# Patient Record
Sex: FEMALE | Race: BLACK OR AFRICAN AMERICAN | NOT HISPANIC OR LATINO | URBAN - METROPOLITAN AREA
[De-identification: names, ages, dates, MRNs, and addresses within clinical notes are randomized per-mention and may not be internally consistent; named-entity substitution may affect disease eponyms.]

---

## 2019-03-29 ENCOUNTER — INPATIENT (INPATIENT)
Facility: HOSPITAL | Age: 64
LOS: 3 days | Discharge: HOME | DRG: 394 | End: 2019-04-02
Attending: SURGERY | Admitting: SURGERY
Payer: MEDICARE

## 2019-03-29 VITALS
HEART RATE: 112 BPM | RESPIRATION RATE: 16 BRPM | DIASTOLIC BLOOD PRESSURE: 78 MMHG | SYSTOLIC BLOOD PRESSURE: 113 MMHG | OXYGEN SATURATION: 99 % | TEMPERATURE: 98 F

## 2019-03-29 DIAGNOSIS — Z90.710 ACQUIRED ABSENCE OF BOTH CERVIX AND UTERUS: Chronic | ICD-10-CM

## 2019-03-29 LAB
ALBUMIN SERPL ELPH-MCNC: 4.6 G/DL — SIGNIFICANT CHANGE UP (ref 3.3–5)
ALP SERPL-CCNC: 77 U/L — SIGNIFICANT CHANGE UP (ref 40–120)
ALT FLD-CCNC: 20 U/L — SIGNIFICANT CHANGE UP (ref 10–45)
ANION GAP SERPL CALC-SCNC: 10 MMOL/L — SIGNIFICANT CHANGE UP (ref 5–17)
APPEARANCE UR: CLEAR — SIGNIFICANT CHANGE UP
APTT BLD: 34 SEC — SIGNIFICANT CHANGE UP (ref 27.5–36.3)
AST SERPL-CCNC: 25 U/L — SIGNIFICANT CHANGE UP (ref 10–40)
BASOPHILS # BLD AUTO: 0.03 K/UL — SIGNIFICANT CHANGE UP (ref 0–0.2)
BASOPHILS NFR BLD AUTO: 0.4 % — SIGNIFICANT CHANGE UP (ref 0–2)
BILIRUB SERPL-MCNC: 0.3 MG/DL — SIGNIFICANT CHANGE UP (ref 0.2–1.2)
BILIRUB UR-MCNC: NEGATIVE — SIGNIFICANT CHANGE UP
BUN SERPL-MCNC: 15 MG/DL — SIGNIFICANT CHANGE UP (ref 7–23)
CALCIUM SERPL-MCNC: 9.3 MG/DL — SIGNIFICANT CHANGE UP (ref 8.4–10.5)
CHLORIDE SERPL-SCNC: 102 MMOL/L — SIGNIFICANT CHANGE UP (ref 96–108)
CK MB CFR SERPL CALC: <1 NG/ML — SIGNIFICANT CHANGE UP (ref 0–6.7)
CK SERPL-CCNC: 104 U/L — SIGNIFICANT CHANGE UP (ref 25–170)
CO2 SERPL-SCNC: 27 MMOL/L — SIGNIFICANT CHANGE UP (ref 22–31)
COLOR SPEC: YELLOW — SIGNIFICANT CHANGE UP
CREAT SERPL-MCNC: 1.17 MG/DL — SIGNIFICANT CHANGE UP (ref 0.5–1.3)
DIFF PNL FLD: NEGATIVE — SIGNIFICANT CHANGE UP
EOSINOPHIL # BLD AUTO: 0.09 K/UL — SIGNIFICANT CHANGE UP (ref 0–0.5)
EOSINOPHIL NFR BLD AUTO: 1.2 % — SIGNIFICANT CHANGE UP (ref 0–6)
GLUCOSE SERPL-MCNC: 108 MG/DL — HIGH (ref 70–99)
GLUCOSE UR QL: NEGATIVE — SIGNIFICANT CHANGE UP
HCT VFR BLD CALC: 39.7 % — SIGNIFICANT CHANGE UP (ref 34.5–45)
HGB BLD-MCNC: 13.4 G/DL — SIGNIFICANT CHANGE UP (ref 11.5–15.5)
IMM GRANULOCYTES NFR BLD AUTO: 0.3 % — SIGNIFICANT CHANGE UP (ref 0–1.5)
INR BLD: 1.04 — SIGNIFICANT CHANGE UP (ref 0.88–1.16)
KETONES UR-MCNC: NEGATIVE — SIGNIFICANT CHANGE UP
LEUKOCYTE ESTERASE UR-ACNC: NEGATIVE — SIGNIFICANT CHANGE UP
LYMPHOCYTES # BLD AUTO: 2.18 K/UL — SIGNIFICANT CHANGE UP (ref 1–3.3)
LYMPHOCYTES # BLD AUTO: 28.5 % — SIGNIFICANT CHANGE UP (ref 13–44)
MCHC RBC-ENTMCNC: 33.8 GM/DL — SIGNIFICANT CHANGE UP (ref 32–36)
MCHC RBC-ENTMCNC: 34 PG — SIGNIFICANT CHANGE UP (ref 27–34)
MCV RBC AUTO: 100.8 FL — HIGH (ref 80–100)
MONOCYTES # BLD AUTO: 0.48 K/UL — SIGNIFICANT CHANGE UP (ref 0–0.9)
MONOCYTES NFR BLD AUTO: 6.3 % — SIGNIFICANT CHANGE UP (ref 2–14)
NEUTROPHILS # BLD AUTO: 4.86 K/UL — SIGNIFICANT CHANGE UP (ref 1.8–7.4)
NEUTROPHILS NFR BLD AUTO: 63.3 % — SIGNIFICANT CHANGE UP (ref 43–77)
NITRITE UR-MCNC: NEGATIVE — SIGNIFICANT CHANGE UP
NRBC # BLD: 0 /100 WBCS — SIGNIFICANT CHANGE UP (ref 0–0)
PH UR: 6 — SIGNIFICANT CHANGE UP (ref 5–8)
PLATELET # BLD AUTO: 193 K/UL — SIGNIFICANT CHANGE UP (ref 150–400)
POTASSIUM SERPL-MCNC: 4.6 MMOL/L — SIGNIFICANT CHANGE UP (ref 3.5–5.3)
POTASSIUM SERPL-SCNC: 4.6 MMOL/L — SIGNIFICANT CHANGE UP (ref 3.5–5.3)
PROT SERPL-MCNC: 7.7 G/DL — SIGNIFICANT CHANGE UP (ref 6–8.3)
PROT UR-MCNC: NEGATIVE MG/DL — SIGNIFICANT CHANGE UP
PROTHROM AB SERPL-ACNC: 11.8 SEC — SIGNIFICANT CHANGE UP (ref 10–12.9)
RBC # BLD: 3.94 M/UL — SIGNIFICANT CHANGE UP (ref 3.8–5.2)
RBC # FLD: 11.9 % — SIGNIFICANT CHANGE UP (ref 10.3–14.5)
SODIUM SERPL-SCNC: 139 MMOL/L — SIGNIFICANT CHANGE UP (ref 135–145)
SP GR SPEC: <=1.005 — SIGNIFICANT CHANGE UP (ref 1–1.03)
TROPONIN T SERPL-MCNC: <0.01 NG/ML — SIGNIFICANT CHANGE UP (ref 0–0.01)
UROBILINOGEN FLD QL: 0.2 E.U./DL — SIGNIFICANT CHANGE UP
WBC # BLD: 7.66 K/UL — SIGNIFICANT CHANGE UP (ref 3.8–10.5)
WBC # FLD AUTO: 7.66 K/UL — SIGNIFICANT CHANGE UP (ref 3.8–10.5)

## 2019-03-29 PROCEDURE — 99285 EMERGENCY DEPT VISIT HI MDM: CPT | Mod: 25

## 2019-03-29 PROCEDURE — 74177 CT ABD & PELVIS W/CONTRAST: CPT | Mod: 26

## 2019-03-29 PROCEDURE — 93010 ELECTROCARDIOGRAM REPORT: CPT

## 2019-03-29 PROCEDURE — 93971 EXTREMITY STUDY: CPT | Mod: 26,LT

## 2019-03-29 RX ORDER — IOHEXOL 300 MG/ML
30 INJECTION, SOLUTION INTRAVENOUS ONCE
Refills: 0 | Status: COMPLETED | OUTPATIENT
Start: 2019-03-29 | End: 2019-03-29

## 2019-03-29 RX ORDER — SODIUM CHLORIDE 9 MG/ML
1000 INJECTION, SOLUTION INTRAVENOUS
Refills: 0 | Status: DISCONTINUED | OUTPATIENT
Start: 2019-03-29 | End: 2019-03-30

## 2019-03-29 RX ORDER — OLANZAPINE 15 MG/1
5 TABLET, FILM COATED ORAL DAILY
Refills: 0 | Status: DISCONTINUED | OUTPATIENT
Start: 2019-03-29 | End: 2019-04-02

## 2019-03-29 RX ORDER — ACETAMINOPHEN 500 MG
650 TABLET ORAL ONCE
Refills: 0 | Status: DISCONTINUED | OUTPATIENT
Start: 2019-03-29 | End: 2019-04-02

## 2019-03-29 RX ORDER — HEPARIN SODIUM 5000 [USP'U]/ML
1500 INJECTION INTRAVENOUS; SUBCUTANEOUS
Qty: 25000 | Refills: 0 | Status: DISCONTINUED | OUTPATIENT
Start: 2019-03-29 | End: 2019-03-30

## 2019-03-29 RX ORDER — ALBUTEROL 90 UG/1
1 AEROSOL, METERED ORAL DAILY
Refills: 0 | Status: DISCONTINUED | OUTPATIENT
Start: 2019-03-29 | End: 2019-04-02

## 2019-03-29 RX ORDER — SERTRALINE 25 MG/1
100 TABLET, FILM COATED ORAL DAILY
Refills: 0 | Status: DISCONTINUED | OUTPATIENT
Start: 2019-03-29 | End: 2019-04-02

## 2019-03-29 RX ORDER — FAMOTIDINE 10 MG/ML
20 INJECTION INTRAVENOUS ONCE
Refills: 0 | Status: COMPLETED | OUTPATIENT
Start: 2019-03-29 | End: 2019-03-29

## 2019-03-29 RX ORDER — HYDROMORPHONE HYDROCHLORIDE 2 MG/ML
0.5 INJECTION INTRAMUSCULAR; INTRAVENOUS; SUBCUTANEOUS EVERY 4 HOURS
Refills: 0 | Status: DISCONTINUED | OUTPATIENT
Start: 2019-03-29 | End: 2019-03-31

## 2019-03-29 RX ORDER — HYDROMORPHONE HYDROCHLORIDE 2 MG/ML
0.25 INJECTION INTRAMUSCULAR; INTRAVENOUS; SUBCUTANEOUS EVERY 4 HOURS
Refills: 0 | Status: DISCONTINUED | OUTPATIENT
Start: 2019-03-29 | End: 2019-03-31

## 2019-03-29 RX ORDER — SODIUM CHLORIDE 9 MG/ML
1000 INJECTION INTRAMUSCULAR; INTRAVENOUS; SUBCUTANEOUS ONCE
Refills: 0 | Status: COMPLETED | OUTPATIENT
Start: 2019-03-29 | End: 2019-03-29

## 2019-03-29 RX ORDER — PIPERACILLIN AND TAZOBACTAM 4; .5 G/20ML; G/20ML
3.38 INJECTION, POWDER, LYOPHILIZED, FOR SOLUTION INTRAVENOUS ONCE
Refills: 0 | Status: COMPLETED | OUTPATIENT
Start: 2019-03-29 | End: 2019-03-29

## 2019-03-29 RX ORDER — SODIUM CHLORIDE 9 MG/ML
1000 INJECTION INTRAMUSCULAR; INTRAVENOUS; SUBCUTANEOUS
Refills: 0 | Status: DISCONTINUED | OUTPATIENT
Start: 2019-03-29 | End: 2019-03-30

## 2019-03-29 RX ORDER — PIPERACILLIN AND TAZOBACTAM 4; .5 G/20ML; G/20ML
3.38 INJECTION, POWDER, LYOPHILIZED, FOR SOLUTION INTRAVENOUS EVERY 6 HOURS
Refills: 0 | Status: DISCONTINUED | OUTPATIENT
Start: 2019-03-29 | End: 2019-04-01

## 2019-03-29 RX ORDER — ONDANSETRON 8 MG/1
4 TABLET, FILM COATED ORAL ONCE
Refills: 0 | Status: COMPLETED | OUTPATIENT
Start: 2019-03-29 | End: 2019-03-29

## 2019-03-29 RX ORDER — ONDANSETRON 8 MG/1
4 TABLET, FILM COATED ORAL EVERY 6 HOURS
Refills: 0 | Status: DISCONTINUED | OUTPATIENT
Start: 2019-03-29 | End: 2019-04-02

## 2019-03-29 RX ADMIN — FAMOTIDINE 20 MILLIGRAM(S): 10 INJECTION INTRAVENOUS at 10:41

## 2019-03-29 RX ADMIN — HEPARIN SODIUM 15 UNIT(S)/HR: 5000 INJECTION INTRAVENOUS; SUBCUTANEOUS at 20:43

## 2019-03-29 RX ADMIN — PIPERACILLIN AND TAZOBACTAM 200 GRAM(S): 4; .5 INJECTION, POWDER, LYOPHILIZED, FOR SOLUTION INTRAVENOUS at 17:38

## 2019-03-29 RX ADMIN — SODIUM CHLORIDE 1000 MILLILITER(S): 9 INJECTION INTRAMUSCULAR; INTRAVENOUS; SUBCUTANEOUS at 18:30

## 2019-03-29 RX ADMIN — IOHEXOL 30 MILLILITER(S): 300 INJECTION, SOLUTION INTRAVENOUS at 10:50

## 2019-03-29 RX ADMIN — SODIUM CHLORIDE 1000 MILLILITER(S): 9 INJECTION INTRAMUSCULAR; INTRAVENOUS; SUBCUTANEOUS at 10:40

## 2019-03-29 RX ADMIN — PIPERACILLIN AND TAZOBACTAM 3.38 GRAM(S): 4; .5 INJECTION, POWDER, LYOPHILIZED, FOR SOLUTION INTRAVENOUS at 18:30

## 2019-03-29 RX ADMIN — ONDANSETRON 4 MILLIGRAM(S): 8 TABLET, FILM COATED ORAL at 10:41

## 2019-03-29 RX ADMIN — SODIUM CHLORIDE 150 MILLILITER(S): 9 INJECTION INTRAMUSCULAR; INTRAVENOUS; SUBCUTANEOUS at 21:44

## 2019-03-29 NOTE — ED ADULT NURSE REASSESSMENT NOTE - NS ED NURSE REASSESS COMMENT FT1
[id number] no deficits no sob no chest pain no n/v.  pt reminded of NPO status.  iv intact.  pending floor assignment.

## 2019-03-29 NOTE — ED PROVIDER NOTE - CARE PLAN
Principal Discharge DX:	Nausea & vomiting  Secondary Diagnosis:	Leg pain Principal Discharge DX:	Nausea & vomiting  Assessment and plan of treatment:	2/2 appendicitis.  Surgery following.  Will keep patient NPO for possible appendectomy.  Secondary Diagnosis:	Leg pain

## 2019-03-29 NOTE — H&P ADULT - NSHPPHYSICALEXAM_GEN_ALL_CORE
General: WN/WD NAD  Neurology: A&Ox3, nonfocal, VALLECILLO x 4  Abdominal: Soft, Tender in RLQ, with mild guarding. no rebound tenderness  Extremities: No edema, + peripheral pulses  Skin: No Rashes, Hematoma, Ecchymosis

## 2019-03-29 NOTE — ED ADULT TRIAGE NOTE - CHIEF COMPLAINT QUOTE
Pt c/o non-bloody vomiting x two days, twice this am. Also reports LLE numbness and tingling, s/p varicose vein surgery on 3/14.

## 2019-03-29 NOTE — ED PROVIDER NOTE - CLINICAL SUMMARY MEDICAL DECISION MAKING FREE TEXT BOX
64 yo F presents w/ nausea, vomiting x 2 days.  Pt has had significant pain following varicose vein ablation procedure.  Stopped NSAIDs.  PE significant for tenderness in RLQ.  No previous hx of diverticulosis. 62 yo F presents w/ nausea, vomiting x 2 days.  Pt has had significant pain following varicose vein ablation procedure.  Stopped NSAIDs.  PE significant for tenderness in RLQ and LLQ.  No previous hx of diverticulosis.  Surgical hx only significant for ARACELIS.  CT a/p reveals likely appendicitis.  LLE pain likely 2/2 thrombophlebitis.  Pt given zosyn.  Surgery and vascular consulted.  Pt will likely go for appendectomy. 64 yo F presents w/ nausea, vomiting x 2 days.  Pt has had significant pain following varicose vein ablation procedure.  Stopped NSAIDs.  PE significant for tenderness in RLQ and LLQ.  No previous hx of diverticulosis.  Surgical hx only significant for ARACELIS.  CT a/p reveals likely appendicitis.   Pt given zosyn.  Surgery and vascular consulted.   Dopplers reveal extensive clot with extension into saphenofemoral junction.  Pt will likely go for appendectomy.  Will likely require anticoagulation post surgery.

## 2019-03-29 NOTE — H&P ADULT - HISTORY OF PRESENT ILLNESS
62 yo F w/ PMHX of bipolar, HLD, varicose veins s/p varicose vein ablation 3/14 in OSH, presenting now with RLQ pain x 3 days. Patient reports that she has been having abdominal pain but tried to ignore it, until yesterday when she began to vomit. This AM, she complained that she had severe abdominal pain, and continuous emesis, prompting her to present to [facility] ED. She otherwise denies fevers or chills. Hx of ARACELIS approximately 10 years ago.

## 2019-03-29 NOTE — CONSULT NOTE ADULT - ASSESSMENT
64 yo F with hx of varicose vein s/p ablation in outside clinic 3/14, p/w abd pain, nausea/vomiting for 2 days and LLE pain since surgery. No leukocytosis. CT abd/pelvis revealed possible acute/chronic appendicitis and US LLE revealed acute thrombus in the left greater saphenous vein with possible extending to saphenofemoral junction.     Recs :  To follow up with general surgery regarding further plan OR vs no OR  Recommending anticoagulant if patient is not going to surgery  Pain control : tylenol/ibuprofen with meals   Recommending repeat US after 1 week of anticoagulant to evaluate the thrombus.     Plan pending to d/w attending, Seen with chief

## 2019-03-29 NOTE — H&P ADULT - NSHPLABSRESULTS_GEN_ALL_CORE
LABS/RADIOLOGY RESULTS:                          13.4   7.66  )-----------( 193      ( 29 Mar 2019 10:53 )             39.7   03-29    139  |  102  |  15  ----------------------------<  108<H>  4.6   |  27  |  1.17    Ca    9.3      29 Mar 2019 10:53    TPro  7.7  /  Alb  4.6  /  TBili  0.3  /  DBili  x   /  AST  25  /  ALT  20  /  AlkPhos  77  03-29  PT/INR - ( 29 Mar 2019 10:53 )   PT: 11.8 sec;   INR: 1.04          PTT - ( 29 Mar 2019 10:53 )  PTT:34.0 secBlood Cultures    < from: CT Abdomen and Pelvis w/ Oral Cont and w/ IV Cont (03.29.19 @ 13:20) >    FINDINGS: CT of the abdomen and pelvis was performed with the   administration of intravenous contrast. Reconstructions were performed in   the sagittal and coronal planes. No prior studies are available for   comparison.    Evaluation of the lower chest demonstrates normal heart size. There is   negative for pleural or pericardial effusion. Lower lung parenchyma is   unremarkable. Evaluation of the abdomen demonstrates that the liver,   spleen, pancreas, bilateral adrenal glands and bilateral kidneys are   normal in appearance. There is minimal cholelithiasis. Evaluation of the   gastrointestinal tract demonstrates sigmoid diverticulosis. There is   circumferential mural thickening of the appendix which is nondistended   and negative for significant periappendiceal edematous infiltration or   fluid collection. Negative for bowel obstruction. Evaluation pelvis   demonstrates the bladder is unremarkable. Hysterectomy. Bilateral adnexal   regions are unremarkable. Small fat-containing right inguinal hernia. No   free fluid. No adenopathy. Mild aortic vascular calcification. Opacified   aspects of the hepatic, portal, splenic, superior mesenteric vein,   bilateral renal veins, IVC and bilateral iliac veins demonstrates no   janina intraluminal thrombus. Evaluation of the osseous structures   demonstratesno destructive lesion.          IMPRESSION:    Findings suggestive of early or possibly chronic appendicitis.    < end of copied text >    < from: US Duplex Venous Lower Ext Ltd, Left (03.29.19 @ 14:52) >      IMPRESSION:  No deep vein thrombosis seen.  Acute thrombus seen in the left greater saphenous vein. Follow-up   recommended to rule out proximal propagation into the left common femoral   vein.      < end of copied text >

## 2019-03-29 NOTE — ED PROVIDER NOTE - GASTROINTESTINAL, MLM
Abdomen soft, point tenderness in LLQ, slight guarding, + BS Abdomen soft, LLQ and RLQ TTP, slight guarding, + BS

## 2019-03-29 NOTE — ED PROVIDER NOTE - OBJECTIVE STATEMENT
64 yo F w/ PMHX of bipolar depression, HLD, varicose veins presents w/ nausea and vomiting x 2 days.  Pt also complaining of severe LLE pain following varicose vein ablation procedure on 3/14.  Pain is rated 9/10, described as a pins and needles sensation.  Pain improved w/ ibuprofen, but patient stopped taking medication after a few days because it was causing stomach discomfort.  Last meal was this morning after which she vomitted, NBNB.  ROS otherwise negative for fevers, chills, CP, SOB, palpitations, abdominal pain, diarrhea, constipation, dysuria, focal motor defects.  Pt reports generalized malaise.

## 2019-03-29 NOTE — ED ADULT NURSE NOTE - OBJECTIVE STATEMENT
Patient is a 62yo female reporting 2 days of non-bloody vomiting s/p vascular surgery for varicose veins on 3/14. Patient denies chest pain, shortness of breath, diarrhea, fevers.

## 2019-03-29 NOTE — H&P ADULT - NSICDXFAMILYHX_GEN_ALL_CORE_FT
FAMILY HISTORY:  Mother  Still living? Unknown  Family history of stroke, Age at diagnosis: Age Unknown

## 2019-03-29 NOTE — CONSULT NOTE ADULT - SUBJECTIVE AND OBJECTIVE BOX
HPI :    This is a 64 yo F w/ PMHX of bipolar, HLD, varicose veins s/p varicose vein ablation 3/14 in outside clinic presents w/ nausea and vomiting x 2 days and severe LLE pain following varicose vein ablation procedure on 3/14.  Pain was severe and described as a pins and needles sensation.  Pain improved w/ ibuprofen, but patient stopped taking medication after a few days because it was causing stomach discomfort.  Last meal was this morning after which she vomitted, NBNB.  ROS otherwise negative for fevers, chills.       Vital Signs Last 24 Hrs  T(C): 36.9 (29 Mar 2019 16:18), Max: 36.9 (29 Mar 2019 09:44)  T(F): 98.5 (29 Mar 2019 16:18), Max: 98.5 (29 Mar 2019 09:44)  HR: 76 (29 Mar 2019 16:18) (76 - 112)  BP: 106/65 (29 Mar 2019 16:18) (106/65 - 113/78)  RR: 16 (29 Mar 2019 16:18) (16 - 16)  SpO2: 99% (29 Mar 2019 16:18) (99% - 99%)  I&O's Detail      General: NAD, resting comfortably in bed  C/V: NSR  Pulm: Nonlabored breathing, no respiratory distress  Abd:   Extrem:         LABS:                        13.4   7.66  )-----------( 193      ( 29 Mar 2019 10:53 )             39.7     03-29    139  |  102  |  15  ----------------------------<  108<H>  4.6   |  27  |  1.17    Ca    9.3      29 Mar 2019 10:53    TPro  7.7  /  Alb  4.6  /  TBili  0.3  /  DBili  x   /  AST  25  /  ALT  20  /  AlkPhos  77  03-29    PT/INR - ( 29 Mar 2019 10:53 )   PT: 11.8 sec;   INR: 1.04          PTT - ( 29 Mar 2019 10:53 )  PTT:34.0 sec      RADIOLOGY & ADDITIONAL STUDIES: HPI :    This is a 64 yo F w/ PMHX of bipolar, HLD, varicose veins s/p varicose vein ablation 3/14 in outside clinic presents w/ nausea and vomiting x 2 days and severe LLE pain following varicose vein ablation procedure on 3/14.  Pain was severe and described as a pins and needles sensation.  Pain improved w/ ibuprofen, but patient stopped taking medication after a few days because it was causing stomach discomfort.  Last meal was this morning after which she vomitted, NBNB.  ROS otherwise negative for fevers, chills.       Vital Signs Last 24 Hrs  T(C): 36.9 (29 Mar 2019 16:18), Max: 36.9 (29 Mar 2019 09:44)  T(F): 98.5 (29 Mar 2019 16:18), Max: 98.5 (29 Mar 2019 09:44)  HR: 76 (29 Mar 2019 16:18) (76 - 112)  BP: 106/65 (29 Mar 2019 16:18) (106/65 - 113/78)  RR: 16 (29 Mar 2019 16:18) (16 - 16)  SpO2: 99% (29 Mar 2019 16:18) (99% - 99%)  I&O's Detail      General: NAD, resting comfortably in bed  C/V: NSR  Pulm: Nonlabored breathing, no respiratory distress  Abd: RLQ tenderness, no rebound, no guarding  Extrem:   LLE >non pitting edema. non tender on palpation. No skin changes       LABS:                        13.4   7.66  )-----------( 193      ( 29 Mar 2019 10:53 )             39.7     03-29    139  |  102  |  15  ----------------------------<  108<H>  4.6   |  27  |  1.17    Ca    9.3      29 Mar 2019 10:53    TPro  7.7  /  Alb  4.6  /  TBili  0.3  /  DBili  x   /  AST  25  /  ALT  20  /  AlkPhos  77  03-29    PT/INR - ( 29 Mar 2019 10:53 )   PT: 11.8 sec;   INR: 1.04          PTT - ( 29 Mar 2019 10:53 )  PTT:34.0 sec      RADIOLOGY & ADDITIONAL STUDIES:  FINDINGS: CT of the abdomen and pelvis was performed with the   administration of intravenous contrast. Reconstructions were performed in   the sagittal and coronal planes. No prior studies are available for   comparison.    Evaluation of the lower chest demonstrates normal heart size. There is   negative for pleural or pericardial effusion. Lower lung parenchyma is   unremarkable. Evaluation of the abdomen demonstrates that the liver,   spleen, pancreas, bilateral adrenal glands and bilateral kidneys are   normal in appearance. There is minimal cholelithiasis. Evaluation of the   gastrointestinal tract demonstrates sigmoid diverticulosis. There is   circumferential mural thickening of the appendix which is nondistended   and negative for significant periappendiceal edematous infiltration or   fluid collection. Negative for bowel obstruction. Evaluation pelvis   demonstrates the bladder is unremarkable. Hysterectomy. Bilateral adnexal   regions are unremarkable. Small fat-containing right inguinal hernia. No   free fluid. No adenopathy. Mild aortic vascular calcification. Opacified   aspects of the hepatic, portal, splenic, superior mesenteric vein,   bilateral renal veins, IVC and bilateral iliac veins demonstrates no   jannia intraluminal thrombus. Evaluation of the osseous structures   demonstrates no destructive lesion.      IMPRESSION:    Findings suggestive of early or possibly chronic appendicitis.    FINDINGS:    Thigh veins: The left common femoral, femoral, popliteal, proximal   greater saphenous, and proximal deep femoral veins are patent and free of   thrombus. The veins are normally compressible and have normal phasic flow   and augmentation response.    Calf veins: The paired peroneal and posterior tibial calf veins are   patent.    Contralateral CFV: The contralateral (right) common femoral vein is   patent and free of thrombus.      There is occlusive acute thrombus involving the left greater saphenous   vein in the left thigh with extension to the saphenofemoral junction. The   SVT involving the left greater saphenous vein extends down to the left   knee. Also evidence of thrombosis of superficial venous varicosities in   the proximal left calf.    IMPRESSION:  No deep vein thrombosis seen.  Acute thrombus seen in the left greater saphenous vein. Follow-up   recommended to rule out proximal propagation into the left common femoral   vein. HPI :    This is a 62 yo F w/ PMHX of bipolar, HLD, varicose veins s/p varicose vein ablation 3/14 in outside clinic p/w LLE pain following varicose vein ablation procedure on 3/14.  Pain was severe and described as a pins and needles sensation.  Pain improved w/ ibuprofen, but patient stopped taking medication after a few days because it was causing stomach discomfort.  Last meal was this morning after which she vomited NBNB.  ROS otherwise negative for fevers, chills.   Patient has been having RLQ abdominal pain, nausea and vomiting for 2 days. Initially she thought that its due to her taking ibuprophen. CT abd showed possible acute/chronic appendicitis.    Vital Signs Last 24 Hrs  T(C): 36.9 (29 Mar 2019 16:18), Max: 36.9 (29 Mar 2019 09:44)  T(F): 98.5 (29 Mar 2019 16:18), Max: 98.5 (29 Mar 2019 09:44)  HR: 76 (29 Mar 2019 16:18) (76 - 112)  BP: 106/65 (29 Mar 2019 16:18) (106/65 - 113/78)  RR: 16 (29 Mar 2019 16:18) (16 - 16)  SpO2: 99% (29 Mar 2019 16:18) (99% - 99%)  I&O's Detail      General: NAD, resting comfortably in bed  C/V: NSR  Pulm: Nonlabored breathing, no respiratory distress  Abd: RLQ tenderness, no rebound, no guarding  Extrem:   LLE >non pitting edema. non tender on palpation. No skin changes       LABS:                        13.4   7.66  )-----------( 193      ( 29 Mar 2019 10:53 )             39.7     03-29    139  |  102  |  15  ----------------------------<  108<H>  4.6   |  27  |  1.17    Ca    9.3      29 Mar 2019 10:53    TPro  7.7  /  Alb  4.6  /  TBili  0.3  /  DBili  x   /  AST  25  /  ALT  20  /  AlkPhos  77  03-29    PT/INR - ( 29 Mar 2019 10:53 )   PT: 11.8 sec;   INR: 1.04          PTT - ( 29 Mar 2019 10:53 )  PTT:34.0 sec      RADIOLOGY & ADDITIONAL STUDIES:  FINDINGS: CT of the abdomen and pelvis was performed with the   administration of intravenous contrast. Reconstructions were performed in   the sagittal and coronal planes. No prior studies are available for   comparison.    Evaluation of the lower chest demonstrates normal heart size. There is   negative for pleural or pericardial effusion. Lower lung parenchyma is   unremarkable. Evaluation of the abdomen demonstrates that the liver,   spleen, pancreas, bilateral adrenal glands and bilateral kidneys are   normal in appearance. There is minimal cholelithiasis. Evaluation of the   gastrointestinal tract demonstrates sigmoid diverticulosis. There is   circumferential mural thickening of the appendix which is nondistended   and negative for significant periappendiceal edematous infiltration or   fluid collection. Negative for bowel obstruction. Evaluation pelvis   demonstrates the bladder is unremarkable. Hysterectomy. Bilateral adnexal   regions are unremarkable. Small fat-containing right inguinal hernia. No   free fluid. No adenopathy. Mild aortic vascular calcification. Opacified   aspects of the hepatic, portal, splenic, superior mesenteric vein,   bilateral renal veins, IVC and bilateral iliac veins demonstrates no   janina intraluminal thrombus. Evaluation of the osseous structures   demonstrates no destructive lesion.      IMPRESSION:    Findings suggestive of early or possibly chronic appendicitis.    FINDINGS:    Thigh veins: The left common femoral, femoral, popliteal, proximal   greater saphenous, and proximal deep femoral veins are patent and free of   thrombus. The veins are normally compressible and have normal phasic flow   and augmentation response.    Calf veins: The paired peroneal and posterior tibial calf veins are   patent.    Contralateral CFV: The contralateral (right) common femoral vein is   patent and free of thrombus.      There is occlusive acute thrombus involving the left greater saphenous   vein in the left thigh with extension to the saphenofemoral junction. The   SVT involving the left greater saphenous vein extends down to the left   knee. Also evidence of thrombosis of superficial venous varicosities in   the proximal left calf.    IMPRESSION:  No deep vein thrombosis seen.  Acute thrombus seen in the left greater saphenous vein. Follow-up   recommended to rule out proximal propagation into the left common femoral   vein.

## 2019-03-29 NOTE — H&P ADULT - ASSESSMENT
62 yo F w/ symptoms and CT scan concerning for acute appendicitis, given her SFJ thrombosis, will opt for non-operative management with IV ABX & IV Heparin, with low threshold to proceed to OR    Will admit to Dr. Blanton  IV ABX  IV Heparin for thrombosis  Only Psych meds PO  NPO/IVF  Serial abdominal exams  Pain & nausea control PRN  AM labs    Seen and examined with chief resident, discussed with attending

## 2019-03-30 LAB
ANION GAP SERPL CALC-SCNC: 10 MMOL/L — SIGNIFICANT CHANGE UP (ref 5–17)
APTT BLD: 40.3 SEC — HIGH (ref 27.5–36.3)
APTT BLD: 46.8 SEC — HIGH (ref 27.5–36.3)
APTT BLD: 85.3 SEC — HIGH (ref 27.5–36.3)
APTT BLD: >200 SEC — CRITICAL HIGH (ref 27.5–36.3)
BUN SERPL-MCNC: 10 MG/DL — SIGNIFICANT CHANGE UP (ref 7–23)
CALCIUM SERPL-MCNC: 8.6 MG/DL — SIGNIFICANT CHANGE UP (ref 8.4–10.5)
CHLORIDE SERPL-SCNC: 105 MMOL/L — SIGNIFICANT CHANGE UP (ref 96–108)
CO2 SERPL-SCNC: 26 MMOL/L — SIGNIFICANT CHANGE UP (ref 22–31)
CREAT SERPL-MCNC: 1.21 MG/DL — SIGNIFICANT CHANGE UP (ref 0.5–1.3)
GLUCOSE SERPL-MCNC: 87 MG/DL — SIGNIFICANT CHANGE UP (ref 70–99)
HCT VFR BLD CALC: 37.5 % — SIGNIFICANT CHANGE UP (ref 34.5–45)
HGB BLD-MCNC: 11.9 G/DL — SIGNIFICANT CHANGE UP (ref 11.5–15.5)
INR BLD: 1.09 — SIGNIFICANT CHANGE UP (ref 0.88–1.16)
MAGNESIUM SERPL-MCNC: 2 MG/DL — SIGNIFICANT CHANGE UP (ref 1.6–2.6)
MCHC RBC-ENTMCNC: 31.7 GM/DL — LOW (ref 32–36)
MCHC RBC-ENTMCNC: 32.3 PG — SIGNIFICANT CHANGE UP (ref 27–34)
MCV RBC AUTO: 101.9 FL — HIGH (ref 80–100)
NRBC # BLD: 0 /100 WBCS — SIGNIFICANT CHANGE UP (ref 0–0)
PHOSPHATE SERPL-MCNC: 4.1 MG/DL — SIGNIFICANT CHANGE UP (ref 2.5–4.5)
PLATELET # BLD AUTO: 187 K/UL — SIGNIFICANT CHANGE UP (ref 150–400)
POTASSIUM SERPL-MCNC: 4.5 MMOL/L — SIGNIFICANT CHANGE UP (ref 3.5–5.3)
POTASSIUM SERPL-SCNC: 4.5 MMOL/L — SIGNIFICANT CHANGE UP (ref 3.5–5.3)
PROTHROM AB SERPL-ACNC: 12.3 SEC — SIGNIFICANT CHANGE UP (ref 10–12.9)
RBC # BLD: 3.68 M/UL — LOW (ref 3.8–5.2)
RBC # FLD: 11.9 % — SIGNIFICANT CHANGE UP (ref 10.3–14.5)
SODIUM SERPL-SCNC: 141 MMOL/L — SIGNIFICANT CHANGE UP (ref 135–145)
WBC # BLD: 5.88 K/UL — SIGNIFICANT CHANGE UP (ref 3.8–10.5)
WBC # FLD AUTO: 5.88 K/UL — SIGNIFICANT CHANGE UP (ref 3.8–10.5)

## 2019-03-30 PROCEDURE — 93971 EXTREMITY STUDY: CPT | Mod: 26,LT

## 2019-03-30 RX ORDER — SODIUM CHLORIDE 9 MG/ML
1000 INJECTION, SOLUTION INTRAVENOUS
Refills: 0 | Status: DISCONTINUED | OUTPATIENT
Start: 2019-03-30 | End: 2019-04-02

## 2019-03-30 RX ORDER — HEPARIN SODIUM 5000 [USP'U]/ML
1200 INJECTION INTRAVENOUS; SUBCUTANEOUS
Qty: 25000 | Refills: 0 | Status: DISCONTINUED | OUTPATIENT
Start: 2019-03-30 | End: 2019-03-30

## 2019-03-30 RX ORDER — HEPARIN SODIUM 5000 [USP'U]/ML
1000 INJECTION INTRAVENOUS; SUBCUTANEOUS
Qty: 25000 | Refills: 0 | Status: DISCONTINUED | OUTPATIENT
Start: 2019-03-30 | End: 2019-03-30

## 2019-03-30 RX ORDER — HEPARIN SODIUM 5000 [USP'U]/ML
1150 INJECTION INTRAVENOUS; SUBCUTANEOUS
Qty: 25000 | Refills: 0 | Status: DISCONTINUED | OUTPATIENT
Start: 2019-03-30 | End: 2019-03-31

## 2019-03-30 RX ADMIN — SERTRALINE 100 MILLIGRAM(S): 25 TABLET, FILM COATED ORAL at 18:16

## 2019-03-30 RX ADMIN — ONDANSETRON 4 MILLIGRAM(S): 8 TABLET, FILM COATED ORAL at 23:03

## 2019-03-30 RX ADMIN — OLANZAPINE 5 MILLIGRAM(S): 15 TABLET, FILM COATED ORAL at 18:15

## 2019-03-30 RX ADMIN — HYDROMORPHONE HYDROCHLORIDE 0.5 MILLIGRAM(S): 2 INJECTION INTRAMUSCULAR; INTRAVENOUS; SUBCUTANEOUS at 00:44

## 2019-03-30 RX ADMIN — ALBUTEROL 1 PUFF(S): 90 AEROSOL, METERED ORAL at 17:41

## 2019-03-30 RX ADMIN — PIPERACILLIN AND TAZOBACTAM 200 GRAM(S): 4; .5 INJECTION, POWDER, LYOPHILIZED, FOR SOLUTION INTRAVENOUS at 18:14

## 2019-03-30 RX ADMIN — PIPERACILLIN AND TAZOBACTAM 200 GRAM(S): 4; .5 INJECTION, POWDER, LYOPHILIZED, FOR SOLUTION INTRAVENOUS at 23:03

## 2019-03-30 RX ADMIN — SODIUM CHLORIDE 110 MILLILITER(S): 9 INJECTION, SOLUTION INTRAVENOUS at 06:12

## 2019-03-30 RX ADMIN — ONDANSETRON 4 MILLIGRAM(S): 8 TABLET, FILM COATED ORAL at 00:23

## 2019-03-30 RX ADMIN — PIPERACILLIN AND TAZOBACTAM 200 GRAM(S): 4; .5 INJECTION, POWDER, LYOPHILIZED, FOR SOLUTION INTRAVENOUS at 06:12

## 2019-03-30 RX ADMIN — PIPERACILLIN AND TAZOBACTAM 200 GRAM(S): 4; .5 INJECTION, POWDER, LYOPHILIZED, FOR SOLUTION INTRAVENOUS at 00:54

## 2019-03-30 NOTE — PROGRESS NOTE ADULT - ASSESSMENT
63 F hx varicose veinss/p ablation 3/14, found to have SFJ thrombosis on presentation, admitted for acute appendicitis, nonoperatively managed given her SFJ thrombosis, with low threshold to proceed to OR    IV ABX  IV Heparin for LLE thrombosis, f/u vascular surg recs  Only Psych meds PO  NPO/IVF  Serial abdominal exams  Pain & nausea control PRN  AM labs

## 2019-03-30 NOTE — PROGRESS NOTE ADULT - ASSESSMENT
64 yo F with hx of varicose vein s/p ablation in outside clinic 3/14, p/w abd pain, nausea/vomiting for 2 days and LLE pain since surgery. No leukocytosis. CT abd/pelvis revealed possible acute/chronic appendicitis and US LLE revealed acute thrombus in the left greater saphenous vein with possible extending to saphenofemoral junction.     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 anticoagulation  - per Dr. Blanton, general surgery amenable to medical management of appendicitis for now, recommend transition to therapeutic Lovenox, and follow up w/ vascular surgery as outpatient w/ Dr. Youssef in 2 weeks for repeat US;    - cont. leg elevation and compression w/ ACE wrap  - Pain control : tylenol/ibuprofen with meals   - have patient call [phone] to schedule appointment for repeat US  call for acute changes or if you have questions   - discussed with chief on call

## 2019-03-31 LAB
ANION GAP SERPL CALC-SCNC: 11 MMOL/L — SIGNIFICANT CHANGE UP (ref 5–17)
APTT BLD: 121.1 SEC — CRITICAL HIGH (ref 27.5–36.3)
APTT BLD: 123.2 SEC — CRITICAL HIGH (ref 27.5–36.3)
APTT BLD: >200 SEC — CRITICAL HIGH (ref 27.5–36.3)
BUN SERPL-MCNC: 8 MG/DL — SIGNIFICANT CHANGE UP (ref 7–23)
CALCIUM SERPL-MCNC: 8.5 MG/DL — SIGNIFICANT CHANGE UP (ref 8.4–10.5)
CHLORIDE SERPL-SCNC: 107 MMOL/L — SIGNIFICANT CHANGE UP (ref 96–108)
CO2 SERPL-SCNC: 22 MMOL/L — SIGNIFICANT CHANGE UP (ref 22–31)
CREAT SERPL-MCNC: 1.11 MG/DL — SIGNIFICANT CHANGE UP (ref 0.5–1.3)
GLUCOSE SERPL-MCNC: 105 MG/DL — HIGH (ref 70–99)
HCT VFR BLD CALC: 36.6 % — SIGNIFICANT CHANGE UP (ref 34.5–45)
HGB BLD-MCNC: 12.1 G/DL — SIGNIFICANT CHANGE UP (ref 11.5–15.5)
INR BLD: 1.15 — SIGNIFICANT CHANGE UP (ref 0.88–1.16)
MAGNESIUM SERPL-MCNC: 2 MG/DL — SIGNIFICANT CHANGE UP (ref 1.6–2.6)
MCHC RBC-ENTMCNC: 33.1 GM/DL — SIGNIFICANT CHANGE UP (ref 32–36)
MCHC RBC-ENTMCNC: 33.1 PG — SIGNIFICANT CHANGE UP (ref 27–34)
MCV RBC AUTO: 100 FL — SIGNIFICANT CHANGE UP (ref 80–100)
NRBC # BLD: 0 /100 WBCS — SIGNIFICANT CHANGE UP (ref 0–0)
PHOSPHATE SERPL-MCNC: 3.7 MG/DL — SIGNIFICANT CHANGE UP (ref 2.5–4.5)
PLATELET # BLD AUTO: 169 K/UL — SIGNIFICANT CHANGE UP (ref 150–400)
POTASSIUM SERPL-MCNC: 3.8 MMOL/L — SIGNIFICANT CHANGE UP (ref 3.5–5.3)
POTASSIUM SERPL-SCNC: 3.8 MMOL/L — SIGNIFICANT CHANGE UP (ref 3.5–5.3)
PROTHROM AB SERPL-ACNC: 13.1 SEC — HIGH (ref 10–12.9)
RBC # BLD: 3.66 M/UL — LOW (ref 3.8–5.2)
RBC # FLD: 11.8 % — SIGNIFICANT CHANGE UP (ref 10.3–14.5)
SODIUM SERPL-SCNC: 140 MMOL/L — SIGNIFICANT CHANGE UP (ref 135–145)
WBC # BLD: 5.57 K/UL — SIGNIFICANT CHANGE UP (ref 3.8–10.5)
WBC # FLD AUTO: 5.57 K/UL — SIGNIFICANT CHANGE UP (ref 3.8–10.5)

## 2019-03-31 RX ORDER — HEPARIN SODIUM 5000 [USP'U]/ML
1050 INJECTION INTRAVENOUS; SUBCUTANEOUS
Qty: 25000 | Refills: 0 | Status: DISCONTINUED | OUTPATIENT
Start: 2019-03-31 | End: 2019-03-31

## 2019-03-31 RX ORDER — HEPARIN SODIUM 5000 [USP'U]/ML
5000 INJECTION INTRAVENOUS; SUBCUTANEOUS EVERY 6 HOURS
Refills: 0 | Status: DISCONTINUED | OUTPATIENT
Start: 2019-03-31 | End: 2019-04-01

## 2019-03-31 RX ORDER — HEPARIN SODIUM 5000 [USP'U]/ML
950 INJECTION INTRAVENOUS; SUBCUTANEOUS
Qty: 25000 | Refills: 0 | Status: DISCONTINUED | OUTPATIENT
Start: 2019-03-31 | End: 2019-04-01

## 2019-03-31 RX ORDER — OXYCODONE AND ACETAMINOPHEN 5; 325 MG/1; MG/1
2 TABLET ORAL EVERY 4 HOURS
Refills: 0 | Status: DISCONTINUED | OUTPATIENT
Start: 2019-03-31 | End: 2019-04-02

## 2019-03-31 RX ORDER — HEPARIN SODIUM 5000 [USP'U]/ML
2500 INJECTION INTRAVENOUS; SUBCUTANEOUS EVERY 6 HOURS
Refills: 0 | Status: DISCONTINUED | OUTPATIENT
Start: 2019-03-31 | End: 2019-04-01

## 2019-03-31 RX ORDER — HEPARIN SODIUM 5000 [USP'U]/ML
5000 INJECTION INTRAVENOUS; SUBCUTANEOUS ONCE
Refills: 0 | Status: COMPLETED | OUTPATIENT
Start: 2019-03-31 | End: 2019-03-31

## 2019-03-31 RX ADMIN — HEPARIN SODIUM 5000 UNIT(S): 5000 INJECTION INTRAVENOUS; SUBCUTANEOUS at 18:21

## 2019-03-31 RX ADMIN — PIPERACILLIN AND TAZOBACTAM 200 GRAM(S): 4; .5 INJECTION, POWDER, LYOPHILIZED, FOR SOLUTION INTRAVENOUS at 18:35

## 2019-03-31 RX ADMIN — PIPERACILLIN AND TAZOBACTAM 200 GRAM(S): 4; .5 INJECTION, POWDER, LYOPHILIZED, FOR SOLUTION INTRAVENOUS at 05:26

## 2019-03-31 RX ADMIN — ONDANSETRON 4 MILLIGRAM(S): 8 TABLET, FILM COATED ORAL at 06:04

## 2019-03-31 RX ADMIN — PIPERACILLIN AND TAZOBACTAM 200 GRAM(S): 4; .5 INJECTION, POWDER, LYOPHILIZED, FOR SOLUTION INTRAVENOUS at 12:16

## 2019-03-31 RX ADMIN — HEPARIN SODIUM 1050 UNIT(S)/HR: 5000 INJECTION INTRAVENOUS; SUBCUTANEOUS at 13:40

## 2019-03-31 RX ADMIN — HEPARIN SODIUM 9.5 UNIT(S)/HR: 5000 INJECTION INTRAVENOUS; SUBCUTANEOUS at 19:25

## 2019-03-31 RX ADMIN — SERTRALINE 100 MILLIGRAM(S): 25 TABLET, FILM COATED ORAL at 11:37

## 2019-03-31 RX ADMIN — SODIUM CHLORIDE 80 MILLILITER(S): 9 INJECTION, SOLUTION INTRAVENOUS at 11:36

## 2019-03-31 RX ADMIN — OLANZAPINE 5 MILLIGRAM(S): 15 TABLET, FILM COATED ORAL at 12:16

## 2019-03-31 RX ADMIN — ALBUTEROL 1 PUFF(S): 90 AEROSOL, METERED ORAL at 11:37

## 2019-03-31 NOTE — PROVIDER CONTACT NOTE (CRITICAL VALUE NOTIFICATION) - BACKGROUND
Patient was admitted for appendicitis and was discovered to have a DVT. Patient has since been on heparin drip to treat clot.

## 2019-03-31 NOTE — PROGRESS NOTE ADULT - ASSESSMENT
63 F hx varicose veinss/p ablation 3/14, found to have SFJ thrombosis on presentation, admitted for acute appendicitis, nonoperatively managed given her SFJ thrombosis, with low threshold to proceed to OR    IV ABX  IV Heparin for LLE thrombosis, f/u vascular surg recs  Only Psych meds PO  CLD/IVF -> advance to regular today  Serial abdominal exams  Pain & nausea control PRN  AM labs

## 2019-03-31 NOTE — PROVIDER CONTACT NOTE (CRITICAL VALUE NOTIFICATION) - RECOMMENDATIONS
Re-draw labs for comfirmation
MD Elizondo aware and will change rate
MD aware and dose will be adjusted

## 2019-03-31 NOTE — PROVIDER CONTACT NOTE (CRITICAL VALUE NOTIFICATION) - ACTION/TREATMENT ORDERED:
Hold heparin for 2 hours. Will restart at 9:20AM. Will endorse to day shift RN
Stop for 2hr and recheck labs during AM lab draws. Heparin Infusion stopped.
MD Elizondo aware and will change rate
MD aware and dose will be adjusted

## 2019-04-01 LAB
ANION GAP SERPL CALC-SCNC: 9 MMOL/L — SIGNIFICANT CHANGE UP (ref 5–17)
APTT BLD: 118.1 SEC — HIGH (ref 27.5–36.3)
APTT BLD: 183.3 SEC — CRITICAL HIGH (ref 27.5–36.3)
APTT BLD: 97.8 SEC — HIGH (ref 27.5–36.3)
BUN SERPL-MCNC: 5 MG/DL — LOW (ref 7–23)
CALCIUM SERPL-MCNC: 8.4 MG/DL — SIGNIFICANT CHANGE UP (ref 8.4–10.5)
CHLORIDE SERPL-SCNC: 111 MMOL/L — HIGH (ref 96–108)
CO2 SERPL-SCNC: 25 MMOL/L — SIGNIFICANT CHANGE UP (ref 22–31)
CREAT SERPL-MCNC: 1.09 MG/DL — SIGNIFICANT CHANGE UP (ref 0.5–1.3)
GLUCOSE SERPL-MCNC: 103 MG/DL — HIGH (ref 70–99)
HCT VFR BLD CALC: 33.3 % — LOW (ref 34.5–45)
HCT VFR BLD CALC: 35.4 % — SIGNIFICANT CHANGE UP (ref 34.5–45)
HGB BLD-MCNC: 10.9 G/DL — LOW (ref 11.5–15.5)
HGB BLD-MCNC: 12 G/DL — SIGNIFICANT CHANGE UP (ref 11.5–15.5)
MAGNESIUM SERPL-MCNC: 2 MG/DL — SIGNIFICANT CHANGE UP (ref 1.6–2.6)
MCHC RBC-ENTMCNC: 32.5 PG — SIGNIFICANT CHANGE UP (ref 27–34)
MCHC RBC-ENTMCNC: 32.7 GM/DL — SIGNIFICANT CHANGE UP (ref 32–36)
MCHC RBC-ENTMCNC: 33.8 PG — SIGNIFICANT CHANGE UP (ref 27–34)
MCHC RBC-ENTMCNC: 33.9 GM/DL — SIGNIFICANT CHANGE UP (ref 32–36)
MCV RBC AUTO: 99.4 FL — SIGNIFICANT CHANGE UP (ref 80–100)
MCV RBC AUTO: 99.7 FL — SIGNIFICANT CHANGE UP (ref 80–100)
NRBC # BLD: 0 /100 WBCS — SIGNIFICANT CHANGE UP (ref 0–0)
NRBC # BLD: 0 /100 WBCS — SIGNIFICANT CHANGE UP (ref 0–0)
PHOSPHATE SERPL-MCNC: 3.6 MG/DL — SIGNIFICANT CHANGE UP (ref 2.5–4.5)
PLATELET # BLD AUTO: 171 K/UL — SIGNIFICANT CHANGE UP (ref 150–400)
PLATELET # BLD AUTO: 172 K/UL — SIGNIFICANT CHANGE UP (ref 150–400)
POTASSIUM SERPL-MCNC: 4.3 MMOL/L — SIGNIFICANT CHANGE UP (ref 3.5–5.3)
POTASSIUM SERPL-SCNC: 4.3 MMOL/L — SIGNIFICANT CHANGE UP (ref 3.5–5.3)
RBC # BLD: 3.35 M/UL — LOW (ref 3.8–5.2)
RBC # BLD: 3.55 M/UL — LOW (ref 3.8–5.2)
RBC # FLD: 11.8 % — SIGNIFICANT CHANGE UP (ref 10.3–14.5)
RBC # FLD: 11.8 % — SIGNIFICANT CHANGE UP (ref 10.3–14.5)
SODIUM SERPL-SCNC: 145 MMOL/L — SIGNIFICANT CHANGE UP (ref 135–145)
WBC # BLD: 6.08 K/UL — SIGNIFICANT CHANGE UP (ref 3.8–10.5)
WBC # BLD: 6.62 K/UL — SIGNIFICANT CHANGE UP (ref 3.8–10.5)
WBC # FLD AUTO: 6.08 K/UL — SIGNIFICANT CHANGE UP (ref 3.8–10.5)
WBC # FLD AUTO: 6.62 K/UL — SIGNIFICANT CHANGE UP (ref 3.8–10.5)

## 2019-04-01 RX ORDER — HEPARIN SODIUM 5000 [USP'U]/ML
600 INJECTION INTRAVENOUS; SUBCUTANEOUS
Qty: 25000 | Refills: 0 | Status: DISCONTINUED | OUTPATIENT
Start: 2019-04-01 | End: 2019-04-01

## 2019-04-01 RX ORDER — FAMOTIDINE 10 MG/ML
20 INJECTION INTRAVENOUS DAILY
Refills: 0 | Status: DISCONTINUED | OUTPATIENT
Start: 2019-04-01 | End: 2019-04-02

## 2019-04-01 RX ORDER — HEPARIN SODIUM 5000 [USP'U]/ML
650 INJECTION INTRAVENOUS; SUBCUTANEOUS
Qty: 25000 | Refills: 0 | Status: DISCONTINUED | OUTPATIENT
Start: 2019-04-01 | End: 2019-04-01

## 2019-04-01 RX ORDER — FONDAPARINUX SODIUM 2.5 MG/.5ML
1 INJECTION, SOLUTION SUBCUTANEOUS
Qty: 42 | Refills: 0
Start: 2019-04-01 | End: 2019-04-21

## 2019-04-01 RX ORDER — RIVAROXABAN 15 MG-20MG
15 KIT ORAL
Refills: 0 | Status: DISCONTINUED | OUTPATIENT
Start: 2019-04-01 | End: 2019-04-02

## 2019-04-01 RX ORDER — ENOXAPARIN SODIUM 100 MG/ML
60 INJECTION SUBCUTANEOUS EVERY 12 HOURS
Refills: 0 | Status: DISCONTINUED | OUTPATIENT
Start: 2019-04-01 | End: 2019-04-01

## 2019-04-01 RX ORDER — HEPARIN SODIUM 5000 [USP'U]/ML
750 INJECTION INTRAVENOUS; SUBCUTANEOUS
Qty: 25000 | Refills: 0 | Status: DISCONTINUED | OUTPATIENT
Start: 2019-04-01 | End: 2019-04-01

## 2019-04-01 RX ADMIN — FAMOTIDINE 20 MILLIGRAM(S): 10 INJECTION INTRAVENOUS at 18:31

## 2019-04-01 RX ADMIN — ONDANSETRON 4 MILLIGRAM(S): 8 TABLET, FILM COATED ORAL at 17:36

## 2019-04-01 RX ADMIN — PIPERACILLIN AND TAZOBACTAM 200 GRAM(S): 4; .5 INJECTION, POWDER, LYOPHILIZED, FOR SOLUTION INTRAVENOUS at 06:08

## 2019-04-01 RX ADMIN — RIVAROXABAN 15 MILLIGRAM(S): KIT at 18:31

## 2019-04-01 RX ADMIN — HEPARIN SODIUM 7.5 UNIT(S)/HR: 5000 INJECTION INTRAVENOUS; SUBCUTANEOUS at 02:05

## 2019-04-01 RX ADMIN — Medication 1 TABLET(S): at 17:10

## 2019-04-01 RX ADMIN — OLANZAPINE 5 MILLIGRAM(S): 15 TABLET, FILM COATED ORAL at 11:15

## 2019-04-01 RX ADMIN — PIPERACILLIN AND TAZOBACTAM 200 GRAM(S): 4; .5 INJECTION, POWDER, LYOPHILIZED, FOR SOLUTION INTRAVENOUS at 00:08

## 2019-04-01 RX ADMIN — ALBUTEROL 1 PUFF(S): 90 AEROSOL, METERED ORAL at 11:16

## 2019-04-01 RX ADMIN — SODIUM CHLORIDE 80 MILLILITER(S): 9 INJECTION, SOLUTION INTRAVENOUS at 17:11

## 2019-04-01 RX ADMIN — SERTRALINE 100 MILLIGRAM(S): 25 TABLET, FILM COATED ORAL at 11:14

## 2019-04-01 NOTE — PROGRESS NOTE ADULT - ASSESSMENT
63 F hx varicose veinss/p ablation 3/14, found to have SFJ thrombosis on presentation, admitted for acute appendicitis, nonoperatively managed given her SFJ thrombosis, with low threshold to proceed to OR    Pain & nausea control PRN  IV ABX  IV Heparin for thrombosis  Only Psych meds PO  soft/ IVF  AM labs

## 2019-04-02 ENCOUNTER — TRANSCRIPTION ENCOUNTER (OUTPATIENT)
Age: 64
End: 2019-04-02

## 2019-04-02 VITALS
HEART RATE: 85 BPM | SYSTOLIC BLOOD PRESSURE: 118 MMHG | TEMPERATURE: 98 F | OXYGEN SATURATION: 99 % | RESPIRATION RATE: 16 BRPM | DIASTOLIC BLOOD PRESSURE: 80 MMHG

## 2019-04-02 LAB
ANION GAP SERPL CALC-SCNC: 9 MMOL/L — SIGNIFICANT CHANGE UP (ref 5–17)
APTT BLD: 45.2 SEC — HIGH (ref 27.5–36.3)
BUN SERPL-MCNC: 5 MG/DL — LOW (ref 7–23)
CALCIUM SERPL-MCNC: 8.8 MG/DL — SIGNIFICANT CHANGE UP (ref 8.4–10.5)
CHLORIDE SERPL-SCNC: 108 MMOL/L — SIGNIFICANT CHANGE UP (ref 96–108)
CO2 SERPL-SCNC: 25 MMOL/L — SIGNIFICANT CHANGE UP (ref 22–31)
CREAT SERPL-MCNC: 0.98 MG/DL — SIGNIFICANT CHANGE UP (ref 0.5–1.3)
GLUCOSE SERPL-MCNC: 84 MG/DL — SIGNIFICANT CHANGE UP (ref 70–99)
HCT VFR BLD CALC: 34.8 % — SIGNIFICANT CHANGE UP (ref 34.5–45)
HGB BLD-MCNC: 11.6 G/DL — SIGNIFICANT CHANGE UP (ref 11.5–15.5)
INR BLD: 1.57 — HIGH (ref 0.88–1.16)
MAGNESIUM SERPL-MCNC: 1.9 MG/DL — SIGNIFICANT CHANGE UP (ref 1.6–2.6)
MCHC RBC-ENTMCNC: 33.1 PG — SIGNIFICANT CHANGE UP (ref 27–34)
MCHC RBC-ENTMCNC: 33.3 GM/DL — SIGNIFICANT CHANGE UP (ref 32–36)
MCV RBC AUTO: 99.4 FL — SIGNIFICANT CHANGE UP (ref 80–100)
NRBC # BLD: 0 /100 WBCS — SIGNIFICANT CHANGE UP (ref 0–0)
PHOSPHATE SERPL-MCNC: 3.7 MG/DL — SIGNIFICANT CHANGE UP (ref 2.5–4.5)
PLATELET # BLD AUTO: 187 K/UL — SIGNIFICANT CHANGE UP (ref 150–400)
POTASSIUM SERPL-MCNC: 4 MMOL/L — SIGNIFICANT CHANGE UP (ref 3.5–5.3)
POTASSIUM SERPL-SCNC: 4 MMOL/L — SIGNIFICANT CHANGE UP (ref 3.5–5.3)
PROTHROM AB SERPL-ACNC: 18 SEC — HIGH (ref 10–12.9)
RBC # BLD: 3.5 M/UL — LOW (ref 3.8–5.2)
RBC # FLD: 11.9 % — SIGNIFICANT CHANGE UP (ref 10.3–14.5)
SODIUM SERPL-SCNC: 142 MMOL/L — SIGNIFICANT CHANGE UP (ref 135–145)
WBC # BLD: 5.06 K/UL — SIGNIFICANT CHANGE UP (ref 3.8–10.5)
WBC # FLD AUTO: 5.06 K/UL — SIGNIFICANT CHANGE UP (ref 3.8–10.5)

## 2019-04-02 RX ADMIN — Medication 1 TABLET(S): at 05:06

## 2019-04-02 RX ADMIN — SERTRALINE 100 MILLIGRAM(S): 25 TABLET, FILM COATED ORAL at 11:15

## 2019-04-02 RX ADMIN — RIVAROXABAN 15 MILLIGRAM(S): KIT at 05:06

## 2019-04-02 RX ADMIN — ALBUTEROL 1 PUFF(S): 90 AEROSOL, METERED ORAL at 11:15

## 2019-04-02 RX ADMIN — OLANZAPINE 5 MILLIGRAM(S): 15 TABLET, FILM COATED ORAL at 11:15

## 2019-04-02 RX ADMIN — FAMOTIDINE 20 MILLIGRAM(S): 10 INJECTION INTRAVENOUS at 11:15

## 2019-04-02 NOTE — PROGRESS NOTE ADULT - ASSESSMENT
63 F hx varicose veinss/p ablation 3/14, found to have SFJ thrombosis on presentation, admitted for acute appendicitis, nonoperatively managed given her SFJ thrombosis, with low threshold to proceed to OR    Pain & nausea control PRN  PO augmentin  Xarelto  Only Psych meds PO  soft/ IVF  AM labs

## 2019-04-02 NOTE — DISCHARGE NOTE PROVIDER - CARE PROVIDER_API CALL
Debra Blanton)  Surgery  [address] [place] [address], [address]  [place] [address]  Phone: [phone]  Follow Up Time: 1 week    Brian Youssef)  Surgery; Vascular Surgery  [address] [address]  Phone: [phone]  Fax: [phone]  Follow Up Time:

## 2019-04-02 NOTE — DISCHARGE NOTE PROVIDER - CARE PROVIDERS DIRECT ADDRESSES
,DirectAddress_Unknown,[email or web address]

## 2019-04-02 NOTE — DISCHARGE NOTE PROVIDER - HOSPITAL COURSE
63 F hx varicose veins s/p ablation 3/14, found to have SFJ thrombosis on presentation, admitted for acute appendicitis. Started on heparin drip and Zosyn. Appendicitis was managed nonoperatively with antibiotics and patient showed improvement on antibiotics. Patient was transitioned to PO augmentin and Xarelto and remained stable. Diet was advanced as tolerated and pain was well controlled on medication. On day of discharge, pt deemed stable and ready to return home with plan to follow up as an outpatient. 63 F hx varicose veins s/p ablation 3/14, found to have SFJ thrombosis on presentation, admitted for acute appendicitis. Started on heparin drip and Zosyn. Appendicitis was managed nonoperatively with antibiotics and patient showed improvement on antibiotics. Patient was transitioned to PO augmentin and Xarelto and remained stable. Diet was advanced as tolerated and pain was well controlled on medication. On day of discharge, pt deemed stable and ready to return home with plan to follow up as an outpatient with additional 1 week antibiotics    .

## 2019-04-02 NOTE — DISCHARGE NOTE PROVIDER - NSDCCPCAREPLAN_GEN_ALL_CORE_FT
PRINCIPAL DISCHARGE DIAGNOSIS  Diagnosis: Appendicitis  Assessment and Plan of Treatment:       SECONDARY DISCHARGE DIAGNOSES  Diagnosis: DVT, lower extremity  Assessment and Plan of Treatment:

## 2019-04-02 NOTE — DISCHARGE NOTE PROVIDER - NSDCFUADDINST_GEN_ALL_CORE_FT
-Follow up with Dr. Blanton in 1-2 weeks. Call the office at [phone] to schedule your appointment.   - Follow up with Vascular Surgery Dr. Youssef within 1 week for an ultrasound to evaluate your lower extremity DVT. Call the office at [phone] to schedule your appointment.   -You should be urinating at least 3-4x per day. Call the office if you experience increasing pain, nausea, vomiting, swelling, redness, or drainage from incision site, temperature >101.4F   - For your DVT, continue taking Xarelto 15mg BID with food as instructed by pharmacist for a total of 21 days(started in hospital 4/1), until 4/22. After that time Take Xarelto 20mg daily with food at least until follow up with vascular surgery  - Continue taking Augmentin (antibiotic) for your appendicitis for full course as instructed. Take all pills prescribed

## 2019-04-02 NOTE — DISCHARGE NOTE NURSING/CASE MANAGEMENT/SOCIAL WORK - NSDCDPATPORTLINK_GEN_ALL_CORE
You can access the [email or web address] Patient Portal, offered by [facility], by registering with the following website: [email or web address]

## 2019-04-02 NOTE — PROGRESS NOTE ADULT - SUBJECTIVE AND OBJECTIVE BOX
SUBJECTIVE:   No acute events overnight.   Patient  pain controlled, out of bed ambulating.   Patient complains of pain in left thigh  Tolerating liquids only. Denies nausea/vomiting    ---------------------------------------------------------------    Vital Signs Last 24 Hrs  T(C): 36.8 (01 Apr 2019 08:22), Max: 36.8 (01 Apr 2019 08:22)  T(F): 98.3 (01 Apr 2019 08:22), Max: 98.3 (01 Apr 2019 08:22)  HR: 85 (01 Apr 2019 08:22) (62 - 85)  BP: 113/69 (01 Apr 2019 08:22) (99/63 - 124/77)  BP(mean): --  RR: 15 (01 Apr 2019 08:22) (15 - 17)  SpO2: 99% (01 Apr 2019 08:22) (95% - 99%)    I&O's Summary    31 Mar 2019 07:01  -  01 Apr 2019 07:00  --------------------------------------------------------  IN: 3538.8 mL / OUT: 2000 mL / NET: 1538.8 mL    01 Apr 2019 07:01  -  01 Apr 2019 08:39  --------------------------------------------------------  IN: 93 mL / OUT: 0 mL / NET: 93 mL        ----------------------------------------------------------------    Physical Exam:  General: NAD, Comfortable in bed     Neuro: A&Ox3  Respiratory: nonlabored breathing, no respiratory distress    Abd: soft, nondistended, RLQ tenderness   Extremities: R thigh tender, RLE edematous, WWP, SCDs in place          -------------------------------------------------------------------    LABS:                        12.0   6.08  )-----------( 171      ( 01 Apr 2019 05:51 )             35.4     04-01    145  |  111<H>  |  5<L>  ----------------------------<  103<H>  4.3   |  25  |  1.09    Ca    8.4      01 Apr 2019 05:51  Phos  3.6     04-01  Mg     2.0     04-01      PT/INR - ( 31 Mar 2019 18:36 )   PT: 13.1 sec;   INR: 1.15          PTT - ( 01 Apr 2019 05:51 )  PTT:118.1 sec        RADIOLOGY & ADDITIONAL STUDIES:
SUBJECTIVE:   No acute events overnight.   Patient  pain controlled, out of bed ambulating.  Tolerating soft diet, denies nausea/vomiting    ---------------------------------------------------------------    Vital Signs Last 24 Hrs  T(C): 36.5 (02 Apr 2019 05:15), Max: 37.2 (01 Apr 2019 13:48)  T(F): 97.7 (02 Apr 2019 05:15), Max: 99 (01 Apr 2019 13:48)  HR: 80 (02 Apr 2019 05:15) (72 - 85)  BP: 132/95 (02 Apr 2019 05:15) (113/69 - 132/95)  BP(mean): --  RR: 16 (02 Apr 2019 05:15) (15 - 16)  SpO2: 96% (02 Apr 2019 05:15) (96% - 99%)    I&O's Summary    01 Apr 2019 07:01  -  02 Apr 2019 07:00  --------------------------------------------------------  IN: 2579.5 mL / OUT: 950 mL / NET: 1629.5 mL        ----------------------------------------------------------------    Physical Exam:  General: NAD, Comfortable in bed     Neuro: A&Ox3  Respiratory: nonlabored breathing, no respiratory distress    Abd: soft, nondistended, RLQ tenderness   Extremities: R thigh tender, RLE edematous, WWP, SCDs in place      -------------------------------------------------------------------    LABS:                        11.6   5.06  )-----------( 187      ( 02 Apr 2019 06:41 )             34.8     04-02    142  |  108  |  5<L>  ----------------------------<  84  4.0   |  25  |  0.98    Ca    8.8      02 Apr 2019 06:41  Phos  3.7     04-02  Mg     1.9     04-02      PT/INR - ( 02 Apr 2019 06:41 )   PT: 18.0 sec;   INR: 1.57          PTT - ( 02 Apr 2019 06:41 )  PTT:45.2 sec        RADIOLOGY & ADDITIONAL STUDIES:
SUBJECTIVE:   No acute events overnight.   Patient feels better, pain controlled, out of bed ambulating.   Patient complains of  Currently NPO . Denies nausea/vomiting  +Bowel movements, +Flatus  Encouraged out of bed ambulating, incentive spirometry    ---------------------------------------------------------------    Vital Signs Last 24 Hrs  T(C): 36.5 (30 Mar 2019 12:45), Max: 37.2 (30 Mar 2019 00:00)  T(F): 97.7 (30 Mar 2019 12:45), Max: 98.9 (30 Mar 2019 00:00)  HR: 103 (30 Mar 2019 12:45) (70 - 103)  BP: 107/68 (30 Mar 2019 12:45) (106/65 - 119/77)  BP(mean): --  RR: 16 (30 Mar 2019 12:45) (15 - 17)  SpO2: 97% (30 Mar 2019 12:45) (97% - 100%)    I&O's Summary    29 Mar 2019 07:01  -  30 Mar 2019 07:00  --------------------------------------------------------  IN: 700 mL / OUT: 500 mL / NET: 200 mL    30 Mar 2019 07:01  -  30 Mar 2019 12:53  --------------------------------------------------------  IN: 210 mL / OUT: 60 mL / NET: 150 mL        ----------------------------------------------------------------    Physical Exam:  General: NAD, Comfortable in bed     Neuro: A&Ox3  Respiratory: nonlabored breathing, no respiratory distress    CV:  RRR  Abd: soft, mild RLQ TTP, nondistended,     : voiding  Extremities: WWP, nonedematous, SCDs in place        -------------------------------------------------------------------    LABS:                        11.9   5.88  )-----------( 187      ( 30 Mar 2019 07:57 )             37.5     03-30    141  |  105  |  10  ----------------------------<  87  4.5   |  26  |  1.21    Ca    8.6      30 Mar 2019 07:57  Phos  4.1     03-30  Mg     2.0     03-30    TPro  7.7  /  Alb  4.6  /  TBili  0.3  /  DBili  x   /  AST  25  /  ALT  20  /  AlkPhos  77  03-29    PT/INR - ( 30 Mar 2019 02:33 )   PT: 12.3 sec;   INR: 1.09          PTT - ( 30 Mar 2019 07:57 )  PTT:40.3 sec  Urinalysis Basic - ( 29 Mar 2019 20:56 )    Color: Yellow / Appearance: Clear / SG: <=1.005 / pH: x  Gluc: x / Ketone: NEGATIVE  / Bili: Negative / Urobili: 0.2 E.U./dL   Blood: x / Protein: NEGATIVE mg/dL / Nitrite: NEGATIVE   Leuk Esterase: NEGATIVE / RBC: x / WBC x   Sq Epi: x / Non Sq Epi: x / Bacteria: x          RADIOLOGY & ADDITIONAL STUDIES:
SUBJECTIVE:   Patient seen and examined by bedside. LLE pain improved. Swelling improved. No cutaneous changes, paresthesias, weakness, or sensory changes. Denies nausea, vomiting, chest pain, shortness of breath, fevers, or chills.       Vitals - Range in last 24h  T(F): , Max: 98.9 (03-30-19 @ 00:00)  HR:  (70 - 103)  BP:  (107/68 - 119/77)  RR:  (15 - 17)  SpO2:  (97% - 100%)    Vitals - Most Recent  T(F): 97.7 (03-30-19 @ 12:45)  HR: 103 (03-30-19 @ 12:45)  BP: 107/68 (03-30-19 @ 12:45)  RR: 16 (03-30-19 @ 12:45)  SpO2: 97% (03-30-19 @ 12:45)  I&O's Detail    29 Mar 2019 07:01  -  30 Mar 2019 07:00  --------------------------------------------------------  IN:    heparin  Infusion.: 10 mL    heparin Infusion: 30 mL    lactated ringers.: 660 mL  Total IN: 700 mL    OUT:    Voided: 500 mL  Total OUT: 500 mL    Total NET: 200 mL      30 Mar 2019 07:01  -  30 Mar 2019 16:30  --------------------------------------------------------  IN:    lactated ringers.: 110 mL  Total IN: 110 mL    OUT:    Voided: 160 mL  Total OUT: 160 mL    Total NET: -50 mL            Physical Exam  General: NAD, resting comfortably in bed  C/V: NSR, non-tachycardic, normotensive  Pulm: Nonlabored breathing, no respiratory distress  Abd: RLQ tenderness, no rebound, no guarding  Extrem:   LLE >non pitting edema. non tender on palpation. No skin changes         LABS:                        11.9   5.88  )-----------( 187      ( 30 Mar 2019 07:57 )             37.5     03-30    141  |  105  |  10  ----------------------------<  87  4.5   |  26  |  1.21    Ca    8.6      30 Mar 2019 07:57  Phos  4.1     03-30  Mg     2.0     03-30    TPro  7.7  /  Alb  4.6  /  TBili  0.3  /  DBili  x   /  AST  25  /  ALT  20  /  AlkPhos  77  03-29    LIVER FUNCTIONS - ( 29 Mar 2019 10:53 )  Alb: 4.6 g/dL / Pro: 7.7 g/dL / ALK PHOS: 77 U/L / ALT: 20 U/L / AST: 25 U/L / GGT: x           PT/INR - ( 30 Mar 2019 02:33 )   PT: 12.3 sec;   INR: 1.09          PTT - ( 30 Mar 2019 07:57 )  PTT:40.3 sec  Urinalysis Basic - ( 29 Mar 2019 20:56 )    Color: Yellow / Appearance: Clear / SG: <=1.005 / pH: x  Gluc: x / Ketone: NEGATIVE  / Bili: Negative / Urobili: 0.2 E.U./dL   Blood: x / Protein: NEGATIVE mg/dL / Nitrite: NEGATIVE   Leuk Esterase: NEGATIVE / RBC: x / WBC x   Sq Epi: x / Non Sq Epi: x / Bacteria: x          MEDICATIONS  (STANDING):  ALBUTerol    90 MICROgram(s) HFA Inhaler 1 Puff(s) Inhalation daily  heparin  Infusion. 1000 Unit(s)/Hr (10 mL/Hr) IV Continuous <Continuous>  lactated ringers. 1000 milliLiter(s) (110 mL/Hr) IV Continuous <Continuous>  OLANZapine 5 milliGRAM(s) Oral daily  piperacillin/tazobactam IVPB. 3.375 Gram(s) IV Intermittent every 6 hours  sertraline 100 milliGRAM(s) Oral daily  sodium chloride 0.9%. 1000 milliLiter(s) (150 mL/Hr) IV Continuous <Continuous>    MEDICATIONS  (PRN):  acetaminophen   Tablet .. 650 milliGRAM(s) Oral once PRN Moderate Pain (4 - 6)  HYDROmorphone  Injectable 0.25 milliGRAM(s) IV Push every 4 hours PRN Moderate Pain (4 - 6)  HYDROmorphone  Injectable 0.5 milliGRAM(s) IV Push every 4 hours PRN Severe Pain (7 - 10)  ondansetron Injectable 4 milliGRAM(s) IV Push every 6 hours PRN Nausea      RADIOLOGY & ADDITIONAL STUDIES:      < from: US Duplex Venous Lower Ext Ltd, Left (03.29.19 @ 14:52) >  There is occlusive acute thrombus involving the left greater saphenous   vein in the left thigh with extension to the saphenofemoral junction. The   SVT involving the left greater saphenous vein extends down to the left   knee. Also evidence of thrombosis of superficial venous varicosities in   the proximal left calf.    IMPRESSION:  No deep vein thrombosis seen.  Acute thrombus seen in the left greater saphenous vein. Follow-up   recommended to rule out proximal propagation into the left common femoral   vein.    < end of copied text >
SUBJECTIVE:   aPTT elevated this AM, held for 2 hours. recheck in 6h  No acute events overnight.   Patient feels better, pain controlled, out of bed ambulating.  Currently on CLD, tolerating well. Denies nausea/vomiting  +Bowel movements, +Flatus. watery bowel movements  Encouraged out of bed ambulating, incentive spirometry    ---------------------------------------------------------------    Vital Signs Last 24 Hrs  T(C): 36.1 (31 Mar 2019 08:00), Max: 36.8 (30 Mar 2019 16:32)  T(F): 97 (31 Mar 2019 08:00), Max: 98.2 (30 Mar 2019 16:32)  HR: 75 (31 Mar 2019 08:00) (75 - 87)  BP: 101/63 (31 Mar 2019 08:00) (101/63 - 143/84)  BP(mean): --  RR: 16 (31 Mar 2019 08:00) (16 - 18)  SpO2: 98% (31 Mar 2019 08:00) (95% - 100%)    I&O's Summary    30 Mar 2019 07:01  -  31 Mar 2019 07:00  --------------------------------------------------------  IN: 1569.5 mL / OUT: 460 mL / NET: 1109.5 mL    31 Mar 2019 07:01  -  31 Mar 2019 13:17  --------------------------------------------------------  IN: 1444.8 mL / OUT: 700 mL / NET: 744.8 mL      ----------------------------------------------------------------    Physical Exam:  General: NAD, Comfortable in bed     Neuro: A&Ox3  Respiratory: nonlabored breathing, no respiratory distress    CV:  RRR  Abd: soft, mild RLQ TTP, nondistended,     : voiding  Extremities: WWP, nonedematous, SCDs in place    -------------------------------------------------------------------    LABS:                                              12.1   5.57  )-----------( 169      ( 31 Mar 2019 06:53 )             36.6       03-31    140  |  107  |  8   ----------------------------<  105<H>  3.8   |  22  |  1.11    Ca    8.5      31 Mar 2019 06:53  Phos  3.7     03-31  Mg     2.0     03-31        Color: Yellow / Appearance: Clear / SG: <=1.005 / pH: x  Gluc: x / Ketone: NEGATIVE  / Bili: Negative / Urobili: 0.2 E.U./dL   Blood: x / Protein: NEGATIVE mg/dL / Nitrite: NEGATIVE   Leuk Esterase: NEGATIVE / RBC: x / WBC x   Sq Epi: x / Non Sq Epi: x / Bacteria: x          RADIOLOGY & ADDITIONAL STUDIES:
DAILY PROGRESS NOTE:    S: Repeat US shows "compared to 3/29/2019, there has been no significant change   in the extensive thrombosis of the left greater saphenous vein. Due to   its proximity to the saphenofemoral junction, it should be treated as   deep venous thrombosis." Patient without complaints currently. NAEO    O:    Vital Signs Last 24 Hrs  T(C): 36.1 (31 Mar 2019 08:00), Max: 36.8 (30 Mar 2019 16:32)  T(F): 97 (31 Mar 2019 08:00), Max: 98.2 (30 Mar 2019 16:32)  HR: 75 (31 Mar 2019 08:00) (75 - 87)  BP: 101/63 (31 Mar 2019 08:00) (101/63 - 143/84)  BP(mean): --  RR: 16 (31 Mar 2019 08:00) (16 - 18)  SpO2: 98% (31 Mar 2019 08:00) (95% - 100%)    I&O's Detail    30 Mar 2019 07:01  -  31 Mar 2019 07:00  --------------------------------------------------------  IN:    dextrose 5% + sodium chloride 0.45%.: 835 mL    heparin Infusion: 36 mL    heparin Infusion: 103.5 mL    lactated ringers.: 395 mL    Solution: 200 mL  Total IN: 1569.5 mL    OUT:    Voided: 460 mL  Total OUT: 460 mL    Total NET: 1109.5 mL      31 Mar 2019 07:01  -  31 Mar 2019 13:14  --------------------------------------------------------  IN:    dextrose 5% + sodium chloride 0.45%.: 770 mL    heparin Infusion: 74.8 mL    Oral Fluid: 500 mL    Solution: 100 mL  Total IN: 1444.8 mL    OUT:    Voided: 700 mL  Total OUT: 700 mL    Total NET: 744.8 mL        Physical Exam  General: NAD, resting comfortably in bed  Pulm: Nonlabored breathing, no respiratory distress  Abd: soft, distended, non-tender  Extrem:   LLE w/ non pitting edema. non tender on palpation. No skin changes      LABS:                        12.1   5.57  )-----------( 169      ( 31 Mar 2019 06:53 )             36.6     03-31    140  |  107  |  8   ----------------------------<  105<H>  3.8   |  22  |  1.11    Ca    8.5      31 Mar 2019 06:53  Phos  3.7     03-31  Mg     2.0     03-31      PT/INR - ( 30 Mar 2019 02:33 )   PT: 12.3 sec;   INR: 1.09          PTT - ( 31 Mar 2019 06:53 )  PTT:>200.0 sec  Urinalysis Basic - ( 29 Mar 2019 20:56 )    Color: Yellow / Appearance: Clear / SG: <=1.005 / pH: x  Gluc: x / Ketone: NEGATIVE  / Bili: Negative / Urobili: 0.2 E.U./dL   Blood: x / Protein: NEGATIVE mg/dL / Nitrite: NEGATIVE   Leuk Esterase: NEGATIVE / RBC: x / WBC x   Sq Epi: x / Non Sq Epi: x / Bacteria: x        RADIOLOGY & ADDITIONAL STUDIES:

## 2019-04-04 DIAGNOSIS — K35.80 UNSPECIFIED ACUTE APPENDICITIS: ICD-10-CM

## 2019-04-04 DIAGNOSIS — Z79.01 LONG TERM (CURRENT) USE OF ANTICOAGULANTS: ICD-10-CM

## 2019-04-04 DIAGNOSIS — R11.2 NAUSEA WITH VOMITING, UNSPECIFIED: ICD-10-CM

## 2019-04-04 DIAGNOSIS — I82.812 EMBOLISM AND THROMBOSIS OF SUPERFICIAL VEINS OF LEFT LOWER EXTREMITY: ICD-10-CM

## 2019-04-04 DIAGNOSIS — I82.512 CHRONIC EMBOLISM AND THROMBOSIS OF LEFT FEMORAL VEIN: ICD-10-CM

## 2019-04-15 ENCOUNTER — EMERGENCY (EMERGENCY)
Facility: HOSPITAL | Age: 64
LOS: 1 days | Discharge: HOME | End: 2019-04-15
Attending: EMERGENCY MEDICINE | Admitting: EMERGENCY MEDICINE
Payer: MEDICARE

## 2019-04-15 VITALS
RESPIRATION RATE: 18 BRPM | DIASTOLIC BLOOD PRESSURE: 79 MMHG | OXYGEN SATURATION: 96 % | WEIGHT: 169.76 LBS | HEART RATE: 91 BPM | HEIGHT: 68 IN | TEMPERATURE: 98 F | SYSTOLIC BLOOD PRESSURE: 127 MMHG

## 2019-04-15 VITALS
OXYGEN SATURATION: 98 % | DIASTOLIC BLOOD PRESSURE: 71 MMHG | TEMPERATURE: 98 F | RESPIRATION RATE: 16 BRPM | SYSTOLIC BLOOD PRESSURE: 102 MMHG | HEART RATE: 87 BPM

## 2019-04-15 DIAGNOSIS — Z90.710 ACQUIRED ABSENCE OF BOTH CERVIX AND UTERUS: Chronic | ICD-10-CM

## 2019-04-15 LAB
ALBUMIN SERPL ELPH-MCNC: 4.5 G/DL — SIGNIFICANT CHANGE UP (ref 3.3–5)
ALP SERPL-CCNC: 72 U/L — SIGNIFICANT CHANGE UP (ref 40–120)
ALT FLD-CCNC: 30 U/L — SIGNIFICANT CHANGE UP (ref 10–45)
ANION GAP SERPL CALC-SCNC: 11 MMOL/L — SIGNIFICANT CHANGE UP (ref 5–17)
AST SERPL-CCNC: 39 U/L — SIGNIFICANT CHANGE UP (ref 10–40)
BASOPHILS # BLD AUTO: 0.02 K/UL — SIGNIFICANT CHANGE UP (ref 0–0.2)
BASOPHILS NFR BLD AUTO: 0.2 % — SIGNIFICANT CHANGE UP (ref 0–2)
BILIRUB SERPL-MCNC: 0.2 MG/DL — SIGNIFICANT CHANGE UP (ref 0.2–1.2)
BLD GP AB SCN SERPL QL: NEGATIVE — SIGNIFICANT CHANGE UP
BUN SERPL-MCNC: 13 MG/DL — SIGNIFICANT CHANGE UP (ref 7–23)
CALCIUM SERPL-MCNC: 9.4 MG/DL — SIGNIFICANT CHANGE UP (ref 8.4–10.5)
CHLORIDE SERPL-SCNC: 103 MMOL/L — SIGNIFICANT CHANGE UP (ref 96–108)
CO2 SERPL-SCNC: 30 MMOL/L — SIGNIFICANT CHANGE UP (ref 22–31)
CREAT SERPL-MCNC: 0.99 MG/DL — SIGNIFICANT CHANGE UP (ref 0.5–1.3)
EOSINOPHIL # BLD AUTO: 0.12 K/UL — SIGNIFICANT CHANGE UP (ref 0–0.5)
EOSINOPHIL NFR BLD AUTO: 1.3 % — SIGNIFICANT CHANGE UP (ref 0–6)
GLUCOSE SERPL-MCNC: 87 MG/DL — SIGNIFICANT CHANGE UP (ref 70–99)
HCT VFR BLD CALC: 40.6 % — SIGNIFICANT CHANGE UP (ref 34.5–45)
HGB BLD-MCNC: 13 G/DL — SIGNIFICANT CHANGE UP (ref 11.5–15.5)
IMM GRANULOCYTES NFR BLD AUTO: 0.3 % — SIGNIFICANT CHANGE UP (ref 0–1.5)
LACTATE SERPL-SCNC: 1.5 MMOL/L — SIGNIFICANT CHANGE UP (ref 0.5–2)
LIDOCAIN IGE QN: 73 U/L — HIGH (ref 7–60)
LYMPHOCYTES # BLD AUTO: 2.77 K/UL — SIGNIFICANT CHANGE UP (ref 1–3.3)
LYMPHOCYTES # BLD AUTO: 31.1 % — SIGNIFICANT CHANGE UP (ref 13–44)
MCHC RBC-ENTMCNC: 32 GM/DL — SIGNIFICANT CHANGE UP (ref 32–36)
MCHC RBC-ENTMCNC: 33.3 PG — SIGNIFICANT CHANGE UP (ref 27–34)
MCV RBC AUTO: 104.1 FL — HIGH (ref 80–100)
MONOCYTES # BLD AUTO: 0.46 K/UL — SIGNIFICANT CHANGE UP (ref 0–0.9)
MONOCYTES NFR BLD AUTO: 5.2 % — SIGNIFICANT CHANGE UP (ref 2–14)
NEUTROPHILS # BLD AUTO: 5.52 K/UL — SIGNIFICANT CHANGE UP (ref 1.8–7.4)
NEUTROPHILS NFR BLD AUTO: 61.9 % — SIGNIFICANT CHANGE UP (ref 43–77)
NRBC # BLD: 0 /100 WBCS — SIGNIFICANT CHANGE UP (ref 0–0)
PLATELET # BLD AUTO: 218 K/UL — SIGNIFICANT CHANGE UP (ref 150–400)
POTASSIUM SERPL-MCNC: 5.2 MMOL/L — SIGNIFICANT CHANGE UP (ref 3.5–5.3)
POTASSIUM SERPL-SCNC: 5.2 MMOL/L — SIGNIFICANT CHANGE UP (ref 3.5–5.3)
PROT SERPL-MCNC: 8.1 G/DL — SIGNIFICANT CHANGE UP (ref 6–8.3)
RBC # BLD: 3.9 M/UL — SIGNIFICANT CHANGE UP (ref 3.8–5.2)
RBC # FLD: 13.1 % — SIGNIFICANT CHANGE UP (ref 10.3–14.5)
RH IG SCN BLD-IMP: POSITIVE — SIGNIFICANT CHANGE UP
SODIUM SERPL-SCNC: 144 MMOL/L — SIGNIFICANT CHANGE UP (ref 135–145)
WBC # BLD: 8.92 K/UL — SIGNIFICANT CHANGE UP (ref 3.8–10.5)
WBC # FLD AUTO: 8.92 K/UL — SIGNIFICANT CHANGE UP (ref 3.8–10.5)

## 2019-04-15 PROCEDURE — 74177 CT ABD & PELVIS W/CONTRAST: CPT | Mod: 26

## 2019-04-15 PROCEDURE — 93010 ELECTROCARDIOGRAM REPORT: CPT

## 2019-04-15 PROCEDURE — 99284 EMERGENCY DEPT VISIT MOD MDM: CPT | Mod: 25

## 2019-04-15 RX ORDER — FAMOTIDINE 10 MG/ML
20 INJECTION INTRAVENOUS ONCE
Refills: 0 | Status: COMPLETED | OUTPATIENT
Start: 2019-04-15 | End: 2019-04-15

## 2019-04-15 RX ORDER — SODIUM CHLORIDE 9 MG/ML
1000 INJECTION INTRAMUSCULAR; INTRAVENOUS; SUBCUTANEOUS ONCE
Refills: 0 | Status: COMPLETED | OUTPATIENT
Start: 2019-04-15 | End: 2019-04-15

## 2019-04-15 RX ORDER — SODIUM CHLORIDE 9 MG/ML
1000 INJECTION INTRAMUSCULAR; INTRAVENOUS; SUBCUTANEOUS ONCE
Refills: 0 | Status: DISCONTINUED | OUTPATIENT
Start: 2019-04-15 | End: 2019-04-19

## 2019-04-15 RX ORDER — ONDANSETRON 8 MG/1
4 TABLET, FILM COATED ORAL ONCE
Refills: 0 | Status: COMPLETED | OUTPATIENT
Start: 2019-04-15 | End: 2019-04-15

## 2019-04-15 RX ORDER — IOHEXOL 300 MG/ML
30 INJECTION, SOLUTION INTRAVENOUS ONCE
Refills: 0 | Status: COMPLETED | OUTPATIENT
Start: 2019-04-15 | End: 2019-04-15

## 2019-04-15 RX ADMIN — SODIUM CHLORIDE 1000 MILLILITER(S): 9 INJECTION INTRAMUSCULAR; INTRAVENOUS; SUBCUTANEOUS at 14:51

## 2019-04-15 RX ADMIN — FAMOTIDINE 20 MILLIGRAM(S): 10 INJECTION INTRAVENOUS at 14:50

## 2019-04-15 RX ADMIN — ONDANSETRON 4 MILLIGRAM(S): 8 TABLET, FILM COATED ORAL at 14:50

## 2019-04-15 RX ADMIN — IOHEXOL 30 MILLILITER(S): 300 INJECTION, SOLUTION INTRAVENOUS at 14:51

## 2019-04-15 NOTE — ED ADULT TRIAGE NOTE - CHIEF COMPLAINT QUOTE
pt c/o epigastric and RLQ pain x 2 weeks, accompanied with nausea and vomiting for the past 2 weeks. pt reports being admitted 2 weeks ago for appendicitis, but was sent home to be treated with PO antibiotics.  states "pain and vomiting is getting worst" ekg in progress.

## 2019-04-15 NOTE — ED PROVIDER NOTE - CARE PLAN
Principal Discharge DX:	Abdominal pain  Secondary Diagnosis:	Diarrhea  Secondary Diagnosis:	Nausea & vomiting

## 2019-04-15 NOTE — ED ADULT NURSE NOTE - OBJECTIVE STATEMENT
Pt came to ER with c/o lower abd pain, nausea and vomiting for about 2 weeks. pt states she has not gotten better since she was discharge and has not been eating.

## 2019-04-15 NOTE — ED PROVIDER NOTE - OBJECTIVE STATEMENT
old hx--    63 F hx varicose veins s/p ablation 3/14, found to have SFJ thrombosis on presentation, admitted for acute appendicitis. Started on heparin drip and Zosyn. Appendicitis was managed nonoperatively with antibiotics and patient showed improvement on antibiotics. Patient was transitioned to PO augmentin and Xarelto and remained stable. Diet was advanced as tolerated and pain was well controlled on medication. On day of discharge, pt deemed stable and ready to return home with plan to follow up as an outpatient with additional 1 week antibiotics 64 yo F with recently dx appendicitis now with N/V diarrhea and lower abd pain x 2 days-  mostly in RLQ--  no fevers or chills -no flank pain  no dysuria or frequency- taking augmentin  x 2 weeks  no recent sushi or raw beef - no sick contacts    old hx--    63 F hx varicose veins s/p ablation 3/14, found to have SFJ thrombosis on presentation, admitted for acute appendicitis. Started on heparin drip and Zosyn. Appendicitis was managed nonoperatively with antibiotics and patient showed improvement on antibiotics. Patient was transitioned to PO augmentin and Xarelto and remained stable. Diet was advanced as tolerated and pain was well controlled on medication. On day of discharge, pt deemed stable and ready to return home with plan to follow up as an outpatient with additional 1 week antibiotics

## 2019-04-15 NOTE — ED PROVIDER NOTE - NSFOLLOWUPINSTRUCTIONS_ED_ALL_ED_FT
Vomiting, Adult  Vomiting occurs when stomach contents are thrown up and out of the mouth. Many people notice nausea before vomiting. Vomiting can make you feel weak and dehydrated. Dehydration can make you tired and thirsty, cause you to have a dry mouth, and decrease how often you urinate. Older adults and people who have other diseases or a weak immune system are at higher risk for dehydration. It is important to treat vomiting as told by your health care provider.    Follow these instructions at home:  Follow your health care provider’s instructions about how to care for yourself at home.    Eating and drinking     Image ImageFollow these recommendations as told by your health care provider:    Take an oral rehydration solution (ORS). This is a drink that is sold at pharmacies and retail stores.  Eat bland, easy-to-digest foods in small amounts as you are able. These foods include bananas, applesauce, rice, lean meats, toast, and crackers.  Drink clear fluids in small amounts as you are able. Clear fluids include water, ice chips, low-calorie sports drinks, and fruit juice that has water added (diluted fruit juice).  Avoid fluids that contain a lot of sugar or caffeine.  Avoid alcohol and foods that are spicy or fatty.    General instructions     Image   Wash your hands frequently with soap and water. If soap and water are not available, use hand . Make sure that everyone in your household washes their hands frequently.  Take over-the-counter and prescription medicines only as told by your health care provider.  Watch your condition for any changes.  Keep all follow-up visits as told by your health care provider. This is important.  Contact a health care provider if:  You have a fever.  You are not able to keep fluids down.  Your vomiting gets worse.  You have new symptoms.  You feel light-headed or dizzy.  You have a headache.  You have muscle cramps.  Get help right away if:  You have pain in your chest, neck, arm, or jaw.  You feel extremely weak or you faint.  You have persistent vomiting.  You have vomit that is bright red or looks like black coffee grounds.  You have stools that are bloody or black, or stools that look like tar.  You have severe pain, cramping, or bloating in your abdomen.  You have a severe headache, a stiff neck, or both.  You have a rash.  You have trouble breathing or you are breathing very quickly.  Your heart is beating very quickly.  Your skin feels cold and clammy.  You feel confused.  You have pain while urinating.  You have signs of dehydration, such as:    Dark urine, or very little or no urine.  Cracked lips.  Dry mouth.  Sunken eyes.  Sleepiness.  Weakness.    These symptoms may represent a serious problem that is an emergency. Do not wait to see if the symptoms will go away. Get medical help right away. Call your local emergency services (911 in the [place] Do not drive yourself to the hospital.     This information is not intended to replace advice given to you by your health care provider. Make sure you discuss any questions you have with your health care provider.

## 2019-04-15 NOTE — ED PROVIDER NOTE - CARE PROVIDER_API CALL
Debra Blanton (MD)  Surgery  [address] [place] [address], [address]  [place] [address]  Phone: [phone]  Follow Up Time: 1-3 Days

## 2019-04-19 DIAGNOSIS — R11.2 NAUSEA WITH VOMITING, UNSPECIFIED: ICD-10-CM

## 2019-04-19 DIAGNOSIS — R10.12 LEFT UPPER QUADRANT PAIN: ICD-10-CM

## 2019-04-19 DIAGNOSIS — R10.31 RIGHT LOWER QUADRANT PAIN: ICD-10-CM

## 2019-04-19 DIAGNOSIS — Z79.899 OTHER LONG TERM (CURRENT) DRUG THERAPY: ICD-10-CM

## 2019-04-19 DIAGNOSIS — E78.5 HYPERLIPIDEMIA, UNSPECIFIED: ICD-10-CM

## 2019-04-19 DIAGNOSIS — Z79.01 LONG TERM (CURRENT) USE OF ANTICOAGULANTS: ICD-10-CM

## 2019-04-19 DIAGNOSIS — Z88.8 ALLERGY STATUS TO OTHER DRUGS, MEDICAMENTS AND BIOLOGICAL SUBSTANCES: ICD-10-CM

## 2019-04-19 DIAGNOSIS — R19.7 DIARRHEA, UNSPECIFIED: ICD-10-CM

## 2019-04-19 DIAGNOSIS — Z79.2 LONG TERM (CURRENT) USE OF ANTIBIOTICS: ICD-10-CM

## 2019-04-20 LAB
CULTURE RESULTS: SIGNIFICANT CHANGE UP
CULTURE RESULTS: SIGNIFICANT CHANGE UP
SPECIMEN SOURCE: SIGNIFICANT CHANGE UP
SPECIMEN SOURCE: SIGNIFICANT CHANGE UP

## 2019-04-23 RX ORDER — RIVAROXABAN 15 MG-20MG
1 KIT ORAL
Qty: 30 | Refills: 1
Start: 2019-04-23 | End: 2019-06-21

## 2020-09-26 NOTE — ED PROVIDER NOTE - EKG ADDITIONAL QUESTION - PERFORMED INDEPENDENT VISUALIZATION
Interval History - Patient seen and examined this am. No new complaints            Vital Signs Last 24 Hrs  T(C): 36.8 (25 Sep 2020 05:07), Max: 36.9 (24 Sep 2020 19:09)  T(F): 98.2 (25 Sep 2020 05:07), Max: 98.4 (24 Sep 2020 19:09)  HR: 63 (25 Sep 2020 05:07) (62 - 72)  BP: 160/69 (25 Sep 2020 05:07) (141/101 - 215/82)  BP(mean): --  RR: 19 (25 Sep 2020 05:07) (17 - 19)  SpO2: 100% (25 Sep 2020 05:07) (99% - 100%)    PHYSICAL EXAM:    General: Well developed; well nourished; in no acute distress  Head: Normocephalic; atraumatic  ENMT: No nasal discharge; airway clear  Neck: Supple; non tender; no masses  Breasts: Soft, nontender; no masses  Respiratory: No wheezes, rales or rhonchi  Cardiovascular: Regular rate and rhythm. S1 and S2 Normal; No murmurs, gallops or rubs  Gastrointestinal: Soft non-tender non-distended; Normal bowel sounds; No hepatosplenomegaly  Genitourinary: No costovertebral angle tenderness  Rectal: No masses or lesions  Extremities: Normal range of motion, No clubbing, cyanosis or edema  Vascular: Peripheral pulses palpable 2+ bilaterally  Skin: Warm and dry. No acute rash  Lymph Nodes: No acute cervical adenopathy  Psychiatric: Cooperative and appropriate      Neurological Exam:  Mental Status - Patient is alert, awake, oriented X3. fluent, names, no dysarthria no aphasia Follows commands well and able to answer questions appropriately. Mood and affect  normal    Cranial Nerves - PERRL, EOMI, VFF, V1-V3 intact, no gross facial asymmetry, tongue/uvula midline    Motor Exam -   Right upper 5/5   Left upper 5/5  Right lower 5/5  Left lower 5/5     nml bulk/tone    Sensory    Intact to light touch and pinprick bilaterally    Coord: FTN intact bilaterally     Gait -  normal      Reflexes:          2+ throughout          Medications  acetaminophen   Tablet .. 650 milliGRAM(s) Oral every 6 hours PRN  cefTRIAXone   IVPB 1000 milliGRAM(s) IV Intermittent every 24 hours  enoxaparin Injectable 40 milliGRAM(s) SubCutaneous daily  hydrALAZINE Injectable 5 milliGRAM(s) IV Push once  lisinopril 40 milliGRAM(s) Oral daily  OXcarbazepine 300 milliGRAM(s) Oral two times a day      Lab      Radiology    < from: CT Head No Cont (09.23.20 @ 18:01) >  IMPRESSION:    No CT evidence of acute hemorrhage or acute calvarial fracture.    < end of copied text >   Ulises Luevano MD  Interventional Cardiology / [facility] Failure [facility] Cardiac Transplant Specialist  [place] Office : [address] [place] [address] [place]. [address]  Tel:   [place] Office : [address] [facility] [place] [address]  Tel: [phone]  Cell : [phone]    Pt is lying in bed comfortable not in distress, no chest pains no SOB no palpitations  	  MEDICATIONS:  enoxaparin Injectable 40 milliGRAM(s) SubCutaneous daily  hydrALAZINE Injectable 5 milliGRAM(s) IV Push once  lisinopril 40 milliGRAM(s) Oral daily        acetaminophen   Tablet .. 650 milliGRAM(s) Oral every 6 hours PRN  OXcarbazepine 300 milliGRAM(s) Oral two times a day            PAST MEDICAL/SURGICAL HISTORY  PAST MEDICAL & SURGICAL HISTORY:  Obesity    Borderline diabetes    Diverticulosis    Hyperlipidemia    Seizure    Hypertension    History of appendectomy    No Past Surgical History        SOCIAL HISTORY: Substance Use (street drugs): ( x ) never used  (  ) other:    FAMILY HISTORY:  Family history of cerebrovascular accident (CVA) (Sibling)        REVIEW OF SYSTEMS:  CONSTITUTIONAL: No fever, weight loss, or fatigue  EYES: No eye pain, visual disturbances, or discharge  ENMT:  No difficulty hearing, tinnitus, vertigo; No sinus or throat pain  BREASTS: No pain, masses, or nipple discharge  GASTROINTESTINAL: No abdominal or epigastric pain. No nausea, vomiting, or hematemesis; No diarrhea or constipation. No melena or hematochezia.  GENITOURINARY: No dysuria, frequency, hematuria, or incontinence  NEUROLOGICAL: No headaches, memory loss, loss of strength, numbness, or tremors  ENDOCRINE: No heat or cold intolerance; No hair loss  MUSCULOSKELETAL: No joint pain or swelling; No muscle, back, or extremity pain  PSYCHIATRIC: No depression, anxiety, mood swings, or difficulty sleeping  HEME/LYMPH: No easy bruising, or bleeding gums  All others negative    PHYSICAL EXAM:  T(C): 36.7 (09-26-20 @ 11:17), Max: 36.9 (09-25-20 @ 21:59)  HR: 59 (09-26-20 @ 11:17) (55 - 64)  BP: 118/61 (09-26-20 @ 11:17) (118/61 - 168/71)  RR: 18 (09-26-20 @ 11:17) (17 - 18)  SpO2: 97% (09-26-20 @ 11:17) (96% - 100%)  Wt(kg): --  I&O's Summary    25 Sep 2020 07:01  -  26 Sep 2020 07:00  --------------------------------------------------------  IN: 630 mL / OUT: 0 mL / NET: 630 mL      Height (cm): 165.1 (09-25 @ 21:59)  Weight (kg): 97.522 (09-25 @ 21:59)  BMI (kg/m2): 35.8 (09-25 @ 21:59)  BSA (m2): 2.04 (09-25 @ 21:59)    GENERAL: NAD  EYES: EOMI, PERRLA, conjunctiva and sclera clear  ENMT: No tonsillar erythema, exudates, or enlargement; Moist mucous membranes, Good dentition, No lesions  Cardiovascular: Normal S1 S2, No JVD, No murmurs, No edema  Respiratory: Lungs clear to auscultation	  Gastrointestinal:  Soft, Non-tender, + BS	  Extremities: Normal range of motion, No clubbing, cyanosis or edema  LYMPH: No lymphadenopathy noted  NERVOUS SYSTEM:  Alert & Oriented X3, Good concentration; Motor Strength 5/5 B/L upper and lower extremities; DTRs 2+ intact and symmetric                                    11.0   4.71  )-----------( 233      ( 26 Sep 2020 06:56 )             32.4     09-26    141  |  101  |  28<H>  ----------------------------<  99  3.6   |  25  |  1.10    Ca    9.5      26 Sep 2020 06:56  Phos  3.3     09-26  Mg     2.4     09-26      proBNP:   Lipid Profile:   HgA1c:   TSH:     Consultant(s) Notes Reviewed:  [x ] YES  [ ] NO    Care Discussed with Consultants/Other Providers [ x] YES  [ ] NO    Imaging Personally Reviewed independently:  [x] YES  [ ] NO    All labs, radiologic studies, vitals, orders and medications list reviewed. Patient is seen and examined at bedside. Case discussed with medical team.             SUBJECTIVE / OVERNIGHT EVENTS: pt sitting comfortably in chair      MEDICATIONS  (STANDING):  enoxaparin Injectable 40 milliGRAM(s) SubCutaneous daily  hydrALAZINE Injectable 5 milliGRAM(s) IV Push once  lisinopril 40 milliGRAM(s) Oral daily  OXcarbazepine 300 milliGRAM(s) Oral two times a day    MEDICATIONS  (PRN):  acetaminophen   Tablet .. 650 milliGRAM(s) Oral every 6 hours PRN Temp greater or equal to 38C (100.4F), Mild Pain (1 - 3), Moderate Pain (4 - 6), Severe Pain (7 - 10)    Vital Signs Last 24 Hrs  T(C): 36.9 (25 Sep 2020 21:59), Max: 36.9 (25 Sep 2020 21:59)  T(F): 98.4 (25 Sep 2020 21:59), Max: 98.4 (25 Sep 2020 21:59)  HR: 64 (25 Sep 2020 21:59) (60 - 65)  BP: 139/55 (25 Sep 2020 21:59) (111/67 - 160/69)  BP(mean): --  RR: 17 (25 Sep 2020 21:59) (17 - 19)  SpO2: 96% (25 Sep 2020 21:59) (96% - 100%)    CAPILLARY BLOOD GLUCOSE        I&O's Summary    25 Sep 2020 07:01  -  25 Sep 2020 22:17  --------------------------------------------------------  IN: 630 mL / OUT: 0 mL / NET: 630 mL        Constitutional: No fever, fatigue  Skin: No rash.  Eyes: No recent vision problems or eye pain.  ENT: No congestion, ear pain, or sore throat.  Cardiovascular: No chest pain or palpation.  Respiratory: No cough, shortness of breath, congestion, or wheezing.  Gastrointestinal: No abdominal pain, nausea, vomiting, or diarrhea.  Genitourinary: No dysuria.  Musculoskeletal: No joint swelling.  Neurologic: No headache.    PHYSICAL EXAM:  GENERAL: NAD  EYES: EOMI, PERRLA  NECK: Supple, No JVD  CHEST/LUNG: dec breath sounds rt base  HEART:  S1 , S2 +  ABDOMEN: soft , bs+  EXTREMITIES:  trace edema  NEUROLOGY:alert awake confused       LABS:                        11.7   7.02  )-----------( 245      ( 25 Sep 2020 06:24 )             36.0     09-25    139  |  100  |  19  ----------------------------<  140<H>  3.9   |  25  |  0.91    Ca    9.8      25 Sep 2020 06:00  Phos  3.4     09-25  Mg     2.5     09-25        CARDIAC MARKERS ( 24 Sep 2020 11:15 )  x     / x     / 220 u/L / x     / x              RADIOLOGY & ADDITIONAL TESTS:    Imaging Personally Reviewed:    Consultant(s) Notes Reviewed:      Care Discussed with Consultants/Other Providers:   Yes

## 2021-04-13 ENCOUNTER — EMERGENCY (EMERGENCY)
Facility: HOSPITAL | Age: 66
LOS: 1 days | Discharge: HOME | End: 2021-04-13
Attending: EMERGENCY MEDICINE | Admitting: EMERGENCY MEDICINE
Payer: MEDICARE

## 2021-04-13 VITALS
OXYGEN SATURATION: 98 % | DIASTOLIC BLOOD PRESSURE: 74 MMHG | RESPIRATION RATE: 18 BRPM | HEART RATE: 96 BPM | TEMPERATURE: 99 F | SYSTOLIC BLOOD PRESSURE: 119 MMHG

## 2021-04-13 VITALS
OXYGEN SATURATION: 100 % | SYSTOLIC BLOOD PRESSURE: 137 MMHG | DIASTOLIC BLOOD PRESSURE: 82 MMHG | RESPIRATION RATE: 18 BRPM | WEIGHT: 147.05 LBS | TEMPERATURE: 100 F | HEIGHT: 69 IN | HEART RATE: 88 BPM

## 2021-04-13 DIAGNOSIS — R21 RASH AND OTHER NONSPECIFIC SKIN ERUPTION: ICD-10-CM

## 2021-04-13 DIAGNOSIS — Z88.6 ALLERGY STATUS TO ANALGESIC AGENT: ICD-10-CM

## 2021-04-13 DIAGNOSIS — B02.9 ZOSTER WITHOUT COMPLICATIONS: ICD-10-CM

## 2021-04-13 DIAGNOSIS — Z90.710 ACQUIRED ABSENCE OF BOTH CERVIX AND UTERUS: Chronic | ICD-10-CM

## 2021-04-13 PROCEDURE — 99283 EMERGENCY DEPT VISIT LOW MDM: CPT

## 2021-04-13 RX ORDER — IBUPROFEN 200 MG
600 TABLET ORAL ONCE
Refills: 0 | Status: COMPLETED | OUTPATIENT
Start: 2021-04-13 | End: 2021-04-13

## 2021-04-13 RX ORDER — VALACYCLOVIR 500 MG/1
1 TABLET, FILM COATED ORAL
Qty: 21 | Refills: 0
Start: 2021-04-13 | End: 2021-04-19

## 2021-04-13 RX ORDER — IBUPROFEN 200 MG
1 TABLET ORAL
Qty: 30 | Refills: 0
Start: 2021-04-13

## 2021-04-13 RX ORDER — OXYCODONE AND ACETAMINOPHEN 5; 325 MG/1; MG/1
1 TABLET ORAL ONCE
Refills: 0 | Status: DISCONTINUED | OUTPATIENT
Start: 2021-04-13 | End: 2021-04-13

## 2021-04-13 RX ORDER — OXYCODONE HYDROCHLORIDE 5 MG/1
1 TABLET ORAL
Qty: 10 | Refills: 0
Start: 2021-04-13

## 2021-04-13 RX ORDER — VALACYCLOVIR 500 MG/1
1000 TABLET, FILM COATED ORAL ONCE
Refills: 0 | Status: COMPLETED | OUTPATIENT
Start: 2021-04-13 | End: 2021-04-13

## 2021-04-13 RX ADMIN — Medication 600 MILLIGRAM(S): at 18:22

## 2021-04-13 RX ADMIN — OXYCODONE AND ACETAMINOPHEN 1 TABLET(S): 5; 325 TABLET ORAL at 18:22

## 2021-04-13 RX ADMIN — VALACYCLOVIR 1000 MILLIGRAM(S): 500 TABLET, FILM COATED ORAL at 18:22

## 2021-04-13 NOTE — ED ADULT TRIAGE NOTE - CHIEF COMPLAINT QUOTE
Pt c/o rash since Saturday on back, worsening. Raised bumps in clusters On R side of back. Pt appears very uncomfortable. Hx of varicella as child. Rash localized to back only. Pt c/o rash since Saturday on back, worsening. Raised scab-like bumps in clusters On left side of back. Pt appears very uncomfortable. Hx of varicella as child. Rash localized to back only.

## 2021-04-13 NOTE — ED ADULT NURSE NOTE - CHIEF COMPLAINT QUOTE
Pt c/o rash since Saturday on back, worsening. Raised scab-like bumps in clusters On left side of back. Pt appears very uncomfortable. Hx of varicella as child. Rash localized to back only.

## 2021-04-13 NOTE — ED PROVIDER NOTE - ATTENDING CONTRIBUTION TO CARE
Addendum to attending statement: I have reviewed the ACP note and agree with the history, exam, and plan of care. I  was available to PA Mullins  as a supervising provider if needed. PA given opportunity to ask questions and request further evaluation / care.  64 y/o female presents today for rash on left side of back and abdomen x 3 days. Pt states she is in a lot of pain. Reports the rash is both itchy and painful. Reports she has not slept because she cannot lay on her back or left side d/t pain. Reports she took a Benadryl last night.  Denies fever, chills, all other ROS negative.  VSS  versicular rash not crossing the midline, c/w shingles  valtrex, analgesia, f/u PCP

## 2021-04-13 NOTE — ED PROVIDER NOTE - NSFOLLOWUPINSTRUCTIONS_ED_ALL_ED_FT
Shingles    WHAT YOU NEED TO KNOW:    Shingles is a painful rash. Shingles is caused by the same virus that causes chickenpox (varicella-zoster). After you get chickenpox, the virus stays in your body for several years without causing any symptoms. Shingles occurs when the virus becomes active again. The active virus travels along a nerve to your skin and causes a rash.    Shingles         DISCHARGE INSTRUCTIONS:    Call your local emergency number (911 in the ) if:   •You have trouble moving your arms, legs, or face.      •You become confused, or have difficulty speaking.      •You have a seizure.      Return to the emergency department if:   •You have weakness in an arm or leg.      •You have dizziness, a severe headache, or hearing or vision loss.      •You have painful, red, warm skin around the blisters, or the blisters drain pus.      •Your neck is stiff or you have trouble moving it.      Call your doctor if:   •You feel weak or have a headache.      •You have a cough, chills, or a fever.      •You have abdominal pain or nausea, or you are vomiting.      •Your rash becomes more itchy or painful.      •Your rash spreads to other parts of your body.      •Your pain worsens and does not go away even after you take medicine.      •You have questions or concerns about your condition or care.      Medicines: You may need any of the following:  •Antiviral medicine helps decrease symptoms and healing time. They may also decrease your risk of developing nerve pain. You will need to start taking them within 3 days of the start of symptoms to prevent nerve pain.      •Prescription pain medicine may be given. Ask your healthcare provider how to take this medicine safely. Some prescription pain medicines contain acetaminophen. Do not take other medicines that contain acetaminophen without talking to your healthcare provider. Too much acetaminophen may cause liver damage. Prescription pain medicine may cause constipation. Ask your healthcare provider how to prevent or treat constipation.       •Acetaminophen decreases pain and fever. It is available without a doctor's order. Ask how much to take and how often to take it. Follow directions. Read the labels of all other medicines you are using to see if they also contain acetaminophen, or ask your doctor or pharmacist. Acetaminophen can cause liver damage if not taken correctly. Do not use more than 4 grams (4,000 milligrams) total of acetaminophen in one day.       •NSAIDs, such as ibuprofen, help decrease swelling, pain, and fever. This medicine is available with or without a doctor's order. NSAIDs can cause stomach bleeding or kidney problems in certain people. If you take blood thinner medicine, always ask if NSAIDs are safe for you. Always read the medicine label and follow directions. Do not give these medicines to children under 6 months of age without direction from your child's healthcare provider.      •Topical anesthetics are used to numb the skin and decrease pain. They can be a cream, gel, spray, or patch.      •Anticonvulsants decrease nerve pain and may help you sleep at night.      •Antidepressants may be used to decrease nerve pain.      •Take your medicine as directed. Contact your healthcare provider if you think your medicine is not helping or if you have side effects. Tell him of her if you are allergic to any medicine. Keep a list of the medicines, vitamins, and herbs you take. Include the amounts, and when and why you take them. Bring the list or the pill bottles to follow-up visits. Carry your medicine list with you in case of an emergency.      Self-care: Keep your rash clean and dry. Cover your rash with a bandage or clothing. Do not use bandages that stick to your skin. The sticky part may irritate your skin and make your rash last longer.    Prevent the spread of germs:          •Wash your hands often. Wash your hands several times each day. Wash after you use the bathroom, change a child's diaper, and before you prepare or eat food. Use soap and water every time. Rub your soapy hands together, lacing your fingers. Wash the front and back of your hands, and in between your fingers. Use the fingers of one hand to scrub under the fingernails of the other hand. Wash for at least 20 seconds. Rinse with warm, running water for several seconds. Then dry your hands with a clean towel or paper towel. Use hand  that contains alcohol if soap and water are not available. Do not touch your eyes, nose, or mouth without washing your hands first.  Handwashing           •Cover a sneeze or cough. Use a tissue that covers your mouth and nose. Throw the tissue away in a trash can right away. Use the bend of your arm if a tissue is not available. Wash your hands well with soap and water or use a hand .      •Stay away from others while you are sick. Avoid crowds as much as possible.      •Ask about vaccines you may need. Talk to your healthcare provider about your vaccine history. He or she will tell you which vaccines you need, and when to get them.      Prevent shingles or another shingles outbreak: A vaccine may be given to help prevent shingles. You can get the vaccine even if you already had shingles. The vaccine can help prevent a future outbreak. If you do get shingles again, the vaccine can keep it from becoming severe. The vaccine comes in 2 forms. Your healthcare provider will tell you which form is right for you. The decision is based on your age and any medical conditions you have. A 2-dose vaccine is usually given to adults 50 years or older. A 1-dose vaccine may be given to adults 60 years or older.    Follow up with your doctor as directed: Write down your questions so you remember to ask them during your visits.    For more information:   •Centers for Disease Control and Prevention  [address] [place], [place] [id number]  Phone: [phone]  Phone: [phone]  Web Address: http://www.cdc.gov

## 2021-04-13 NOTE — ED PROVIDER NOTE - PATIENT PORTAL LINK FT
You can access the FollowMyHealth Patient Portal offered by [facility] by registering at the following website: [email or web address]. By joining [employer]s FollowMyHealth portal, you will also be able to view your health information using other applications (apps) compatible with our system.

## 2021-04-13 NOTE — ED PROVIDER NOTE - OBJECTIVE STATEMENT
64 y/o female presents today for rash on left side of back and abdomen x 3 days. Pt states she is in a lot of pain. Reports the rash is both itchy and painful. Reports she has not slept because she cannot lay on her back or left side d/t pain. Reports she took a Benadryl last night. 64 y/o female presents today for rash on left side of back and abdomen x 3 days. Pt states she is in a lot of pain. Reports the rash is both itchy and painful. Reports she has not slept because she cannot lay on her back or left side d/t pain. Reports she took a Benadryl last night.  Denies fever, chills, all other ROS negative.

## 2021-04-13 NOTE — ED PROVIDER NOTE - CLINICAL SUMMARY MEDICAL DECISION MAKING FREE TEXT BOX
64 y/o f presents c/o painful rash to left back and abdomen; exam consistent with shingles, treated with pain meds, valtrex, will d/c on both, f/u pmd

## 2021-04-13 NOTE — ED PROVIDER NOTE - SKIN RASH DESCRIPTION
RAISED/REDDENED/VESICULAR vesicular rash from left mid back extending to left abdomen, does not cross midline/RAISED/REDDENED/VESICULAR

## 2021-04-14 RX ORDER — CILOSTAZOL 100 MG/1
0 TABLET ORAL
Qty: 60 | Refills: 0 | DISCHARGE

## 2021-04-14 RX ORDER — ALBUTEROL 90 UG/1
0 AEROSOL, METERED ORAL
Qty: 18 | Refills: 0 | DISCHARGE

## 2021-04-14 RX ORDER — TRAMADOL HYDROCHLORIDE 50 MG/1
0 TABLET ORAL
Qty: 21 | Refills: 0 | DISCHARGE

## 2021-04-14 RX ORDER — OLANZAPINE 15 MG/1
0 TABLET, FILM COATED ORAL
Qty: 30 | Refills: 0 | DISCHARGE

## 2021-04-14 RX ORDER — ROSUVASTATIN CALCIUM 5 MG/1
0 TABLET ORAL
Qty: 30 | Refills: 0 | DISCHARGE

## 2021-04-14 RX ORDER — CYPROHEPTADINE HYDROCHLORIDE 4 MG/1
0 TABLET ORAL
Qty: 60 | Refills: 0 | DISCHARGE

## 2021-04-14 RX ORDER — NALOXEGOL OXALATE 12.5 MG/1
0 TABLET, FILM COATED ORAL
Qty: 30 | Refills: 0 | DISCHARGE

## 2021-04-14 RX ORDER — ZOLPIDEM TARTRATE 10 MG/1
0 TABLET ORAL
Qty: 30 | Refills: 0 | DISCHARGE

## 2021-04-14 RX ORDER — SERTRALINE 25 MG/1
0 TABLET, FILM COATED ORAL
Qty: 45 | Refills: 0 | DISCHARGE

## 2022-06-06 NOTE — PROGRESS NOTE ADULT - ASSESSMENT
Lv:2/17/22  Apt: 8/17/22   · Assessment		  64 yo F with hx of varicose vein s/p ablation in outside clinic 3/14, p/w abd pain, nausea/vomiting for 2 days and LLE pain since surgery. No leukocytosis. CT abd/pelvis revealed possible acute/chronic appendicitis and US LLE revealed acute thrombus in the left greater saphenous vein with possible extending to saphenofemoral junction.     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 anticoagulation  ·	- per Dr. Blanton, general surgery amenable to medical management of appendicitis for now, if not undergoing surgery right now recommend transition to therapeutic Xarelto (not Lovenox), and follow up w/ vascular surgery as outpatient w/ Dr. Youssef in 2 weeks for repeat US  - cont. leg elevation and compression w/ ACE wrap  - Pain control : tylenol/ibuprofen with meals   - have patient call [phone] to schedule appointment for repeat US  call for acute changes or if you have questions   - discussed with chief on call · Assessment		  62 yo F with hx of varicose vein s/p ablation in outside clinic 3/14, p/w abd pain, nausea/vomiting for 2 days and LLE pain since surgery. No leukocytosis. CT abd/pelvis revealed possible acute/chronic appendicitis and US LLE revealed acute thrombus in the left greater saphenous vein with possible extending to saphenofemoral junction.     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 anticoagulation  ·	- per Dr. Blanton, general surgery amenable to medical management of appendicitis for now, if not undergoing surgery right now recommend transition to therapeutic Xarelto (not Lovenox), and follow up w/ vascular surgery as outpatient w/ Dr. Youssef in 2 weeks for repeat US  - cont. leg elevation and compression w/ ACE wrap  - Pain control : tylenol/ibuprofen with meals   - have patient call [phone] to schedule appointment for repeat US  call for acute changes or if you have questions   - discussed with chief on call  - vascular surgery will sign off, re-consult as needed
